# Patient Record
Sex: FEMALE | Employment: OTHER | URBAN - METROPOLITAN AREA
[De-identification: names, ages, dates, MRNs, and addresses within clinical notes are randomized per-mention and may not be internally consistent; named-entity substitution may affect disease eponyms.]

---

## 2017-03-07 ENCOUNTER — PRE VISIT (OUTPATIENT)
Dept: ORTHOPEDICS | Facility: CLINIC | Age: 72
End: 2017-03-07

## 2017-03-07 NOTE — TELEPHONE ENCOUNTER
1.  Date/reason for appt: 3/24/17 11AM - Bilateral Foot Pain  2.  Referring provider: Michell Giraldo NP  3.  Call to patient (Yes / No - short description): No, pt is referred  4.  Previous care at / records requested from: [facility] -- Will need to fax records request when closer to appt date

## 2017-03-24 ENCOUNTER — OFFICE VISIT (OUTPATIENT)
Dept: ORTHOPEDICS | Facility: CLINIC | Age: 72
End: 2017-03-24

## 2017-03-24 DIAGNOSIS — M21.41 PES PLANUS OF BOTH FEET: ICD-10-CM

## 2017-03-24 DIAGNOSIS — M72.2 PLANTAR FASCIITIS OF RIGHT FOOT: Primary | ICD-10-CM

## 2017-03-24 DIAGNOSIS — M21.42 PES PLANUS OF BOTH FEET: ICD-10-CM

## 2017-03-24 RX ORDER — HYDROCHLOROTHIAZIDE 25 MG/1
25 TABLET ORAL
COMMUNITY
End: 2021-03-12

## 2017-03-24 NOTE — NURSING NOTE
Reason For Visit:   Chief Complaint   Patient presents with     Consult     Right heel pain. Pt stated that the pain so bad that she cant put her foot on the floor.        Primary MD: Michell Giraldo  Ref. MD: SILVERIO calvin    ?  Yes, specify language: [facility]      Date of surgery: none  Type of surgery: none.    Smoker: No  Request smoking cessation information: No      Pain Assessment  Patient Currently in Pain: Yes  Primary Pain Location: Foot  Pain Orientation: Right

## 2017-03-24 NOTE — MR AVS SNAPSHOT
After Visit Summary   3/24/2017    Evonne Mccurdy    MRN: [id number]           Patient Information     Date Of Birth          1945        Visit Information        Provider Department      3/24/2017 10:45 AM Allison Crawford; Patel Taylor DPM [facility]        Today's Diagnoses     Plantar fasciitis of right foot    -  1    Pes planus of both feet          Care Instructions      Plantar Fasciitis  Plantar fasciitis is a painful swelling of the plantar fascia. The plantar fascia is a thick, fibrous layer of tissue. It covers the bones on the bottom of your foot. And it supports the foot bones in an arched position.  This can happen gradually or suddenly. It usually affects one foot at a time. Heel pain can be sharp, like a knife sticking into the bottom of your foot. You may feel pain after exercising, long-distance jogging, stair climbing, long periods of standing, or after standing up.  Risk factors include: non-active lifestyle, arthritis, diabetes, obesity or recent weight gain, flat foot, high arch. Wearing high heels, loose shoes, or shoes with poor arch support for long periods of time adds to the risk. This problem is commonly found in runners and dancers. It also found in people who stand on hard surfaces for long periods of time.    Foot pain from this condition is usually worse in the morning. But it improves with walking. By the end of the day there may be a dull aching. Treatment requires short-term rest and controlling swelling. It may take up to 9 months before all symptoms go away. Rarely, a steroid injection into the foot, or surgery, may be needed.  Home care    If you are overweight, lose weight to help healing.    Choose supportive shoes with good arch support and shock absorbency. Replace athletic shoes when they become worn out. Don t walk or run barefoot.    Premade or custom-fitted shoe inserts may be helpful. Inserts made of silicone seem to be the  most effective. Custom-made inserts can be provided by a podiatrist or foot specialist, physical therapist, or orthopedist.    Premade or custom-made night splints keep the heel stretched out while you sleep. They may prevent morning pain.    Avoid activities that stress the feet: jogging, prolonged standing or walking, contact sports, etc.    First thing in the morning and before sports, stretch the bottom of your feet. Gently flex your ankle so the toes move toward your knee.    Icing may help control heel pain. Apply an ice pack to the heel for 10-20 minutes as a preventive. Or ice your heel after a severe flare-up of symptoms. You may repeat this every 1-2 hours as needed. You can also freeze a water bottle and roll this along the bottom of your foot in the morning.    You may use over-the-counter pain medicine to control pain, unless another medicine was prescribed. Anti-inflammatory pain medicines, such as ibuprofen or naproxen, may work better than acetaminophen. If you have chronic liver or kidney disease or ever had a stomach ulcer or GI bleeding, talk with your healthcare provider before using these medicines.        Follow-up care   Follow up with your healthcare provider, physical therapist, or podiatrist or foot specialist as advised.  Call for an appointment if pain worsens or there is no relief after a few weeks of home treatment. Shoe inserts, a night splint, or a special boot may be required.  If X-rays were taken, you will be told of any new findings that may affect your care.  When to seek medical advice  Call your healthcare provider right away if any of these occur:     Foot swelling    Redness with increasing pain    9236-4089 The [employer]. [address] [place] [address], [place] [address]. All rights reserved. This information is not intended as a substitute for professional medical care. Always follow your healthcare professional's instructions.              Follow-ups after your visit         Who to contact     Please call your clinic at [phone] to:    Ask questions about your health    Make or cancel appointments    Discuss your medicines    Learn about your test results    Speak to your doctor   If you have compliments or concerns about an experience at your clinic, or if you wish to file a complaint, please contact [facility] Patient Relations at [phone] or email us at [email or web address]         Additional Information About Your Visit        E[employer] Information     [employer] is an electronic gateway that provides easy, online access to your medical records. With [employer], you can request a clinic appointment, read your test results, renew a prescription or communicate with your care team.     To sign up for [employer] visit the website at [email or web address]   You will be asked to enter the access code listed below, as well as some personal information. Please follow the directions to create your username and password.     Your access code is: [email or web address]  Expires: 2017  7:30 AM     Your access code will  in 90 days. If you need help or a new code, please contact your [facility] or call [phone] for assistance.        Care EveryWhere ID     This is your Care EveryWhere ID. This could be used by other organizations to access your [place] medical records  [id number]         Blood Pressure from Last 3 Encounters:   No data found for BP    Weight from Last 3 Encounters:   No data found for Wt               Primary Care Provider Office Phone # Fax #    Galarza JON Giraldo [phone]       [facility]  [facility] [address]        Thank you!     Thank you for choosing [facility]  for your care. Our goal is always to provide you with excellent care. Hearing back from our patients is one way we can continue to improve our services. Please take  a few minutes to complete the written survey that you may receive in the mail after your visit with us. Thank you!             Your Updated Medication List - Protect others around you: Learn how to safely use, store and throw away your medicines at www.disposemymeds.org.          This list is accurate as of: 3/24/17 11:42 AM.  Always use your most recent med list.                   Brand Name Dispense Instructions for use    hydrochlorothiazide 25 MG tablet    HYDRODIURIL     Take 25 mg by mouth       ranitidine 150 MG tablet    ZANTAC     Take 150 mg by mouth

## 2017-03-24 NOTE — LETTER
3/24/2017       RE: Evonne Mccurdy  [address]  [address]  [place] [address]     Dear Colleague,    Thank you for referring your patient, Evonne Mccurdy, to the [facility] at [facility]. Please see a copy of my visit note below.    Date of Service: 3/24/2017    Chief Complaint:   Chief Complaint   Patient presents with     Consult     Right heel pain. Pt stated that the pain so bad that she cant put her foot on the floor.         HPI: Evonne is a 72 year old female who presents today for further evaluation of right heel pain. This has been going on for the past year, but has worsened in the past 5 months. No increase in activity. She wears sketchers slip on tennis shoes every day. She has tried massaging area with petroleum jelly and black seed oil but this sometimes can make the pain worse. Only staying off of her feet helps relieve the pain. Worst with first steps of the day Denies recent trauma or skin changes to the painful area.    PMH: No past medical history on file.    PSxH: No past surgical history on file.    Allergies: Grapefruit concentrate; Fish-derived products; and Omeprazole    SH:   Social History     Social History     Marital status: Single     Spouse name: N/A     Number of children: N/A     Years of education: N/A     Occupational History     Not on file.     Social History Main Topics     Smoking status: Never Smoker     Smokeless tobacco: Not on file     Alcohol use Not on file     Drug use: Not on file     Sexual activity: Not on file     Other Topics Concern     Not on file     Social History Narrative     No narrative on file       FH: No family history on file.    Objective:  PT and DP pulses are 2/4 bilaterally. CRT is <3 seconds. Diminished pedal hair.   Gross sensation is intact bilaterally.   Equinus absent bilaterally. No pain with active or passive ROM of the ankle, MTJ, 1st ray, or halluces bilaterally. Tenderness to  palpation of the plantar/lateral calcaneous of right foot. Minimal tenderness to palpation of right arch. No tenderness to palpation of tendons of left or right foot. No gross tendon voids. No erythema, edema or ecchymosis along painful site. Flat foot type bilaterally.  Nails normal bilaterally. No open lesions are noted. Skin is normal.     X-rays of the right foot 3/24/17:   Bone spur right calcaneus. Minimal arthritic changes. No acute fracture.     Assessment:   - Plantar fasciitis of the right foot  - Pes planus    Plan:  - Pt seen and evaluated. Diagnosis and treatment options discussed.   - Educated patient on stretching therapies and using ice and NSAIDs for pain relief. Talked about trying OTC arch supports like Power Steps.   - X-rays discussed with patient  - Patient declines injection today.  - Dispensed a night splint  - RTC 8 weeks          Again, thank you for allowing me to participate in the care of your patient.      Sincerely,    Patel Taylor DPM

## 2017-03-24 NOTE — PATIENT INSTRUCTIONS

## 2017-03-24 NOTE — PROGRESS NOTES
Date of Service: 3/24/2017    Chief Complaint:   Chief Complaint   Patient presents with     Consult     Right heel pain. Pt stated that the pain so bad that she cant put her foot on the floor.         HPI: Evonne is a 72 year old female who presents today for further evaluation of right heel pain. This has been going on for the past year, but has worsened in the past 5 months. No increase in activity. She wears sketchers slip on tennis shoes every day. She has tried massaging area with petroleum jelly and black seed oil but this sometimes can make the pain worse. Only staying off of her feet helps relieve the pain. Worst with first steps of the day Denies recent trauma or skin changes to the painful area.    PMH: No past medical history on file.    PSxH: No past surgical history on file.    Allergies: Grapefruit concentrate; Fish-derived products; and Omeprazole    SH:   Social History     Social History     Marital status: Single     Spouse name: N/A     Number of children: N/A     Years of education: N/A     Occupational History     Not on file.     Social History Main Topics     Smoking status: Never Smoker     Smokeless tobacco: Not on file     Alcohol use Not on file     Drug use: Not on file     Sexual activity: Not on file     Other Topics Concern     Not on file     Social History Narrative     No narrative on file       FH: No family history on file.    Objective:  PT and DP pulses are 2/4 bilaterally. CRT is <3 seconds. Diminished pedal hair.   Gross sensation is intact bilaterally.   Equinus absent bilaterally. No pain with active or passive ROM of the ankle, MTJ, 1st ray, or halluces bilaterally. Tenderness to palpation of the plantar/lateral calcaneous of right foot. Minimal tenderness to palpation of right arch. No tenderness to palpation of tendons of left or right foot. No gross tendon voids. No erythema, edema or ecchymosis along painful site. Flat foot type bilaterally.  Nails normal bilaterally.  No open lesions are noted. Skin is normal.     X-rays of the right foot 3/24/17:   Bone spur right calcaneus. Minimal arthritic changes. No acute fracture.     Assessment:   - Plantar fasciitis of the right foot  - Pes planus    Plan:  - Pt seen and evaluated. Diagnosis and treatment options discussed.   - Educated patient on stretching therapies and using ice and NSAIDs for pain relief. Talked about trying OTC arch supports like Power Steps.   - X-rays discussed with patient  - Patient declines injection today.  - Dispensed a night splint  - RTC 8 weeks

## 2017-05-12 ENCOUNTER — OFFICE VISIT (OUTPATIENT)
Dept: ORTHOPEDICS | Facility: CLINIC | Age: 72
End: 2017-05-12

## 2017-05-12 DIAGNOSIS — M72.2 PLANTAR FASCIITIS: Primary | ICD-10-CM

## 2017-05-12 DIAGNOSIS — M21.42 PES PLANUS OF BOTH FEET: ICD-10-CM

## 2017-05-12 DIAGNOSIS — M21.41 PES PLANUS OF BOTH FEET: ICD-10-CM

## 2017-05-12 RX ORDER — DEXAMETHASONE SODIUM PHOSPHATE 4 MG/ML
INJECTION, SOLUTION INTRA-ARTICULAR; INTRALESIONAL; INTRAMUSCULAR; INTRAVENOUS; SOFT TISSUE
Qty: 120 ML | Refills: 0 | Status: SHIPPED | OUTPATIENT
Start: 2017-05-12

## 2017-05-12 NOTE — MR AVS SNAPSHOT
After Visit Summary   2017    Jeanette Mccurdy    MRN: [id number]           Patient Information     Date Of Birth          1945        Visit Information        Provider Department      2017 10:45 AM Rafael Padilla; Patel Taylor DPM [facility]        Today's Diagnoses     Plantar fasciitis    -  1    Pes planus of both feet           Follow-ups after your visit        Additional Services     PHYSICAL THERAPY REFERRAL (Internal)       Physical Therapy Referral                  Who to contact     Please call your clinic at [phone] to:    Ask questions about your health    Make or cancel appointments    Discuss your medicines    Learn about your test results    Speak to your doctor   If you have compliments or concerns about an experience at your clinic, or if you wish to file a complaint, please contact [facility] Patient Relations at [phone] or email us at [email or web address]         Additional Information About Your Visit        MyChart Information     [employer] is an electronic gateway that provides easy, online access to your medical records. With [employer], you can request a clinic appointment, read your test results, renew a prescription or communicate with your care team.     To sign up for [employer] visit the website at [email or web address]   You will be asked to enter the access code listed below, as well as some personal information. Please follow the directions to create your username and password.     Your access code is: [email or web address]  Expires: 2017  7:30 AM     Your access code will  in 90 days. If you need help or a new code, please contact your [facility] or call [phone] for assistance.        Care EveryWhere ID     This is your Care EveryWhere ID. This could be used by other organizations to access your [place] medical records  [id number]         Blood  Pressure from Last 3 Encounters:   No data found for BP    Weight from Last 3 Encounters:   No data found for Wt              We Performed the Following     PHYSICAL THERAPY REFERRAL (Internal)          Today's Medication Changes          These changes are accurate as of: 5/12/17 12:30 PM.  If you have any questions, ask your nurse or doctor.               Start taking these medicines.        Dose/Directions    dexamethasone 4 MG/ML injection   Commonly known as:  DECADRON   Used for:  Plantar fasciitis   Started by:  Patel Taylor DPM        Use 4 mg or dose determined by provider for iontophoresis.   Quantity:  120 mL   Refills:  0            Where to get your medicines      These medications were sent to [facility] - [place], [place] - [address] [place] [address]  [address] [facility] [address] [address]     Phone:  [phone]     dexamethasone 4 MG/ML injection                Primary Care Provider Office Phone # Fax #    Michell GiraldoJON [phone]       [address] [address]        Thank you!     Thank you for choosing [facility]  for your care. Our goal is always to provide you with excellent care. Hearing back from our patients is one way we can continue to improve our services. Please take a few minutes to complete the written survey that you may receive in the mail after your visit with us. Thank you!             Your Updated Medication List - Protect others around you: Learn how to safely use, store and throw away your medicines at www.disposemymeds.org.          This list is accurate as of: 5/12/17 12:30 PM.  Always use your most recent med list.                   Brand Name Dispense Instructions for use    dexamethasone 4 MG/ML injection    DECADRON    120 mL    Use 4 mg or dose determined by provider for iontophoresis.       hydrochlorothiazide 25 MG tablet    HYDRODIURIL     Take 25 mg by mouth       ranitidine  150 MG tablet    ZANTAC     Take 150 mg by mouth

## 2017-05-12 NOTE — LETTER
5/12/2017       RE: Jeanette Mccurdy  [address]  [address]  [place] [address]     Dear Colleague,    Thank you for referring your patient, eJanette Mccurdy, to the [facility] at [facility]. Please see a copy of my visit note below.    Chief Complaint:   Chief Complaint   Patient presents with     RECHECK     Follow up, Right foot pain.           Allergies   Allergen Reactions     Grapefruit Concentrate Itching     Fish-Derived Products Itching     Omeprazole GI Disturbance         Subjective: Jeanette is a 72 year old female who presents to the clinic today for a follow up of right plantar fasciitis. Her pain has only worsened in the past 2 months. She can hardly walk. She admits to performing all therapies suggested at last visit, including NSAIDs, ice, massage, stretches and the night splint. She is willing to try physical therapy, but not an injection.    Objective  PT and DP pulses are 2/4 bilaterally. CRT is <3 seconds. Diminished pedal hair.   Gross sensation is intact bilaterally.   Equinus absent bilaterally. No pain with active or passive ROM of the ankle, MTJ, 1st ray, or halluces bilaterally. Tenderness to palpation of the plantar/lateral calcaneous of right foot, right at the calcaneal tuberosity. No tenderness to palpation of right arch. Tenderness to palpation of right PT tendon as it courses around the medial malleolus.. No gross tendon voids. No erythema, edema or ecchymosis along painful site. Flat foot type bilaterally.  Nails normal bilaterally. No open lesions are noted. Skin is normal.     Assessment:   - Plantar fasciitis Right  - Pes planus    Plan:   - Pt seen and evaluated  - Pt refused injection at this time.   - Dispensed CAM boot to be worn for about 1 month during the day.  - Also suggested Physical therapy with iontophoresis. She is willing to try this.  - Pt to return to clinic if pain does not resolve      Again, thank you for  allowing me to participate in the care of your patient.      Sincerely,    Patel Taylor DPM

## 2017-05-12 NOTE — NURSING NOTE
Reason For Visit:   Chief Complaint   Patient presents with     RECHECK     Follow up, Right foot pain.        Pain Assessment  Patient Currently in Pain: Yes (Pt stated that the pain is getting worse. )  Primary Pain Location: Foot  Pain Orientation: Right                    Current Outpatient Prescriptions   Medication Sig Dispense Refill     hydrochlorothiazide (HYDRODIURIL) 25 MG tablet Take 25 mg by mouth       ranitidine (ZANTAC) 150 MG tablet Take 150 mg by mouth            Allergies   Allergen Reactions     Grapefruit Concentrate Itching     Fish-Derived Products Itching     Omeprazole GI Disturbance

## 2017-05-12 NOTE — LETTER
5/12/2017      RE: Jeanette Mccurdy  [address]]  [address]  [place] [address]       Chief Complaint:   Chief Complaint   Patient presents with     RECHECK     Follow up, Right foot pain.           Allergies   Allergen Reactions     Grapefruit Concentrate Itching     Fish-Derived Products Itching     Omeprazole GI Disturbance         Subjective: Jeanette is a 72 year old female who presents to the clinic today for a follow up of right plantar fasciitis. Her pain has only worsened in the past 2 months. She can hardly walk. She admits to performing all therapies suggested at last visit, including NSAIDs, ice, massage, stretches and the night splint. She is willing to try physical therapy, but not an injection.    Objective  PT and DP pulses are 2/4 bilaterally. CRT is <3 seconds. Diminished pedal hair.   Gross sensation is intact bilaterally.   Equinus absent bilaterally. No pain with active or passive ROM of the ankle, MTJ, 1st ray, or halluces bilaterally. Tenderness to palpation of the plantar/lateral calcaneous of right foot, right at the calcaneal tuberosity. No tenderness to palpation of right arch. Tenderness to palpation of right PT tendon as it courses around the medial malleolus.. No gross tendon voids. No erythema, edema or ecchymosis along painful site. Flat foot type bilaterally.  Nails normal bilaterally. No open lesions are noted. Skin is normal.     Assessment:   - Plantar fasciitis Right  - Pes planus    Plan:   - Pt seen and evaluated  - Pt refused injection at this time.   - Dispensed CAM boot to be worn for about 1 month during the day.  - Also suggested Physical therapy with iontophoresis. She is willing to try this.  - Pt to return to clinic if pain does not resolve      Patel Taylor DPM

## 2017-05-12 NOTE — PROGRESS NOTES
Chief Complaint:   Chief Complaint   Patient presents with     RECHECK     Follow up, Right foot pain.           Allergies   Allergen Reactions     Grapefruit Concentrate Itching     Fish-Derived Products Itching     Omeprazole GI Disturbance         Subjective: Jeanette is a 72 year old female who presents to the clinic today for a follow up of right plantar fasciitis. Her pain has only worsened in the past 2 months. She can hardly walk. She admits to performing all therapies suggested at last visit, including NSAIDs, ice, massage, stretches and the night splint. She is willing to try physical therapy, but not an injection.    Objective  PT and DP pulses are 2/4 bilaterally. CRT is <3 seconds. Diminished pedal hair.   Gross sensation is intact bilaterally.   Equinus absent bilaterally. No pain with active or passive ROM of the ankle, MTJ, 1st ray, or halluces bilaterally. Tenderness to palpation of the plantar/lateral calcaneous of right foot, right at the calcaneal tuberosity. No tenderness to palpation of right arch. Tenderness to palpation of right PT tendon as it courses around the medial malleolus.. No gross tendon voids. No erythema, edema or ecchymosis along painful site. Flat foot type bilaterally.  Nails normal bilaterally. No open lesions are noted. Skin is normal.     Assessment:   - Plantar fasciitis Right  - Pes planus    Plan:   - Pt seen and evaluated  - Pt refused injection at this time.   - Dispensed CAM boot to be worn for about 1 month during the day.  - Also suggested Physical therapy with iontophoresis. She is willing to try this.  - Pt to return to clinic if pain does not resolve

## 2017-06-09 ENCOUNTER — OFFICE VISIT (OUTPATIENT)
Dept: ORTHOPEDICS | Facility: CLINIC | Age: 72
End: 2017-06-09

## 2017-06-09 VITALS — WEIGHT: 173.5 LBS | HEIGHT: 63 IN | BODY MASS INDEX: 30.74 KG/M2

## 2017-06-09 DIAGNOSIS — M72.2 PLANTAR FASCIITIS: Primary | ICD-10-CM

## 2017-06-09 DIAGNOSIS — M21.42 PES PLANUS OF BOTH FEET: ICD-10-CM

## 2017-06-09 DIAGNOSIS — M21.41 PES PLANUS OF BOTH FEET: ICD-10-CM

## 2017-06-09 NOTE — MR AVS SNAPSHOT
After Visit Summary   2017    Jeanette Mccurdy    MRN: [id number]           Patient Information     Date Of Birth          1945        Visit Information        Provider Department      2017 12:05 PM Rafael Padilla Aaron Daniel, DPM [facility]         Follow-ups after your visit        Your next 10 appointments already scheduled     2017 12:00 PM CDT   (Arrive by 11:45 AM)   RETURN FOOT/ANKLE with JACK Liu [facility] ([facility] [place])    [address] [place] [address] [phone]              Who to contact     Please call your clinic at [phone] to:    Ask questions about your health    Make or cancel appointments    Discuss your medicines    Learn about your test results    Speak to your doctor   If you have compliments or concerns about an experience at your clinic, or if you wish to file a complaint, please contact [facility] Patient Relations at [phone] or email us at [email or web address]         Additional Information About Your Visit        MyChart Information     [employer] is an electronic gateway that provides easy, online access to your medical records. With [employer], you can request a clinic appointment, read your test results, renew a prescription or communicate with your care team.     To sign up for [employer] visit the website at [email or web address]   You will be asked to enter the access code listed below, as well as some personal information. Please follow the directions to create your username and password.     Your access code is: [email or web address]  Expires: 2017 12:48 PM     Your access code will  in 90 days. If you need help or a new code, please contact your [facility] or call [phone] for assistance.        Care EveryWhere ID     This is your Care EveryWhere ID.  "This could be used by other organizations to access your [facility] medical records  [id number]        Your Vitals Were     Height BMI (Body Mass Index)                1.6 m (5' 3\") 30.73 kg/m2           Blood Pressure from Last 3 Encounters:   No data found for BP    Weight from Last 3 Encounters:   06/09/17 78.7 kg (173 lb 8 oz)              Today, you had the following     No orders found for display       Primary Care Provider Office Phone # Fax #    Michell JON Giraldo [phone]       [facility] [address] [address]        Thank you!     Thank you for choosing [facility]  for your care. Our goal is always to provide you with excellent care. Hearing back from our patients is one way we can continue to improve our services. Please take a few minutes to complete the written survey that you may receive in the mail after your visit with us. Thank you!             Your Updated Medication List - Protect others around you: Learn how to safely use, store and throw away your medicines at www.disposemymeds.org.          This list is accurate as of: 6/9/17 12:48 PM.  Always use your most recent med list.                   Brand Name Dispense Instructions for use    dexamethasone 4 MG/ML injection    DECADRON    120 mL    Use 4 mg or dose determined by provider for iontophoresis.       hydrochlorothiazide 25 MG tablet    HYDRODIURIL     Take 25 mg by mouth       ranitidine 150 MG tablet    ZANTAC     Take 150 mg by mouth         "

## 2017-06-09 NOTE — NURSING NOTE
"Reason For Visit:   Chief Complaint   Patient presents with     Foot Problems     f/u right foot pain       Ht 1.6 m (5' 3\")  Wt 78.7 kg (173 lb 8 oz)  BMI 30.73 kg/m2    Pain Assessment  Patient Currently in Pain: Yes  0-10 Pain Scale: 6  Primary Pain Location: Foot  Pain Orientation: Right  Alleviating Factors: Pain medication (ibuprofen)  Aggravating Factors: Walking  "

## 2017-06-09 NOTE — LETTER
"6/9/2017       RE: Jeanette Mccurdy  [address]    [place] [address]     Dear Colleague,    Thank you for referring your patient, Jeanette Mccurdy, to the [facility]. Please see a copy of my visit note below.    Chief Complaint:   Chief Complaint   Patient presents with     Foot Problems     f/u right foot pain     Allergies   Allergen Reactions     Grapefruit Concentrate Itching     Fish-Derived Products Itching     Omeprazole GI Disturbance   Subjective: Jeaentte is a 72 year old female who presents to the clinic today for a follow up of right plantar fasciitis. She relates that she is feeling a little better with the CAM. She has not used the night splint in weeks and has not started PT. She is still having some pain.    Objective   5' 3\" 173 lbs 8 oz  Pain noted with palpation of the medial origin of the plantar fascia on the right. Pain with palpation of the courses of the PT and peroneal tendons, however there is no pain with heel raises on the affected side.     Assessment: Right foot plantar fasciitis.     Plan:   - Pt seen and evaluated  - She should return to using the night splint. Also needs to start with PT. I think these modalities will be helpful for her.   - Pt to return to clinic in 6 weeks after starting PT.       Again, thank you for allowing me to participate in the care of your patient.      Sincerely,    Patel Taylor DPM      "

## 2017-06-09 NOTE — PROGRESS NOTES
"Chief Complaint:   Chief Complaint   Patient presents with     Foot Problems     f/u right foot pain          Allergies   Allergen Reactions     Grapefruit Concentrate Itching     Fish-Derived Products Itching     Omeprazole GI Disturbance         Subjective: Jeanette is a 72 year old female who presents to the clinic today for a follow up of right plantar fasciitis. She relates that she is feeling a little better with the CAM. She has not used the night splint in weeks and has not started PT. She is still having some pain.    Objective   5' 3\" 173 lbs 8 oz  Pain noted with palpation of the medial origin of the plantar fascia on the right. Pain with palpation of the courses of the PT and peroneal tendons, however there is no pain with heel raises on the affected side.     Assessment: Right foot plantar fasciitis.     Plan:   - Pt seen and evaluated  - She should return to using the night splint. Also needs to start with PT. I think these modalities will be helpful for her.   - Pt to return to clinic in 6 weeks after starting PT.     "

## 2017-07-21 ENCOUNTER — OFFICE VISIT (OUTPATIENT)
Dept: ORTHOPEDICS | Facility: CLINIC | Age: 72
End: 2017-07-21

## 2017-07-21 DIAGNOSIS — M72.2 PLANTAR FASCIITIS: Primary | ICD-10-CM

## 2017-07-21 DIAGNOSIS — M21.42 PES PLANUS OF BOTH FEET: ICD-10-CM

## 2017-07-21 DIAGNOSIS — M21.41 PES PLANUS OF BOTH FEET: ICD-10-CM

## 2017-07-21 NOTE — MR AVS SNAPSHOT
After Visit Summary   2017    Jeanette Mccurdy    MRN: [id number]           Patient Information     Date Of Birth          1945        Visit Information        Provider Department      2017 11:45 AM Rafael Padilla Aaron Daniel, DPM M [facility]         Follow-ups after your visit        Your next 10 appointments already scheduled     Sep 01, 2017  3:20 PM CDT   (Arrive by 3:05 PM)   RETURN FOOT/ANKLE with JACK Liu [facility] ([facility] [place])    [address] [place] [address] [phone]              Who to contact     Please call your clinic at [phone] to:    Ask questions about your health    Make or cancel appointments    Discuss your medicines    Learn about your test results    Speak to your doctor   If you have compliments or concerns about an experience at your clinic, or if you wish to file a complaint, please contact [facility] Patient Relations at [phone] or email us at [email or web address]         Additional Information About Your Visit        MyChart Information     [employer] is an electronic gateway that provides easy, online access to your medical records. With [employer], you can request a clinic appointment, read your test results, renew a prescription or communicate with your care team.     To sign up for [employer] visit the website at [email or web address]   You will be asked to enter the access code listed below, as well as some personal information. Please follow the directions to create your username and password.     Your access code is: [email or web address]  Expires: 2017 12:48 PM     Your access code will  in 90 days. If you need help or a new code, please contact your [facility] or call [phone] for assistance.        Care EveryWhere ID     This is your Care EveryWhere  ID. This could be used by other organizations to access your [facility] [place] medical records  [id number]         Blood Pressure from Last 3 Encounters:   No data found for BP    Weight from Last 3 Encounters:   06/09/17 78.7 kg (173 lb 8 oz)              Today, you had the following     No orders found for display       Primary Care Provider Office Phone # Fax #    Michell Giraldo [phone]       [facility] [address] [address]        Equal Access to Services     CHARLY WILKINSON : Hadii aad ku hadasho Soomaali, waaxda luqadaha, qaybta kaalmada adeegyada, waxay idiin hayaan adeeg kharash la'dion . So [facility] [phone].    ATENCIÓN: Si habla español, tiene a field disposición servicios gratuitos de asistencia lingüística. [facility] [phone].    We comply with applicable federal civil rights laws and [place] laws. We do not discriminate on the basis of race, color, national origin, age, disability sex, sexual orientation or gender identity.            Thank you!     Thank you for choosing [facility]  for your care. Our goal is always to provide you with excellent care. Hearing back from our patients is one way we can continue to improve our services. Please take a few minutes to complete the written survey that you may receive in the mail after your visit with us. Thank you!             Your Updated Medication List - Protect others around you: Learn how to safely use, store and throw away your medicines at www.disposemymeds.org.          This list is accurate as of: 7/21/17 12:46 PM.  Always use your most recent med list.                   Brand Name Dispense Instructions for use Diagnosis    dexamethasone 4 MG/ML injection    DECADRON    120 mL    Use 4 mg or dose determined by provider for iontophoresis.    Plantar fasciitis       hydrochlorothiazide 25 MG tablet    HYDRODIURIL     Take 25 mg by mouth        ranitidine 150 MG tablet    ZANTAC     Take  150 mg by mouth

## 2017-07-21 NOTE — NURSING NOTE
Reason For Visit:   Chief Complaint   Patient presents with     RECHECK     Follow up for right foot pain.        Pain Assessment  Patient Currently in Pain: Yes  0-10 Pain Scale: 7  Primary Pain Location: Foot  Pain Orientation: Right    The following medication was given:     MEDICATION:  Kenalog 40 mg  ROUTE: Intra-articular   SITE: Right medial heel  DOSE: 1 mL  LOT #: [id number]  : [employer]  EXPIRATION DATE: 12/2018  NDC#: 6034-4100-17   Was there drug waste? No      Mary Ruth LPN  July 21, 2017    The following medication was given:     MEDICATION:  Dexamethasone  ROUTE: Intra-articular   SITE: Right medial heel  DOSE: 1 mL  LOT #: [id number]  : [employer]  EXPIRATION DATE: 08/18  NDC#: 62616-269-85   Was there drug waste? No      Mary Ruth LPN  July 21, 2017    The following medication was given:     MEDICATION:  Lidocaine without epinephrine  ROUTE: Intra-articular   SITE: Rigth medial heel  DOSE: 1 mL  LOT #: [id number]   : [employer]  EXPIRATION DATE: 3.1.19  NDC#: 6233-8831-10   Was there drug waste? Yes  Amount of drug waste (mL): 19.  Reason for waste:  multidose vial      Mary Ruth LPN  July 21, 2017

## 2017-07-21 NOTE — LETTER
7/21/2017       RE: Jeanette Mccurdy  [address]    [place] [address]     Dear Colleague,    Thank you for referring your patient, Jeanette Mccurdy, to the [facility] at [facility]. Please see a copy of my visit note below.    Chief Complaint: No chief complaint on file.         Allergies   Allergen Reactions     Grapefruit Concentrate Itching     Fish-Derived Products Itching     Omeprazole GI Disturbance         Subjective: Jeanette is a 72 year old female who presents to the clinic today for a follow up of right plantar fasciitis. Since her last visit, she has not started PT. she presents today with a [employer] . She relates that she told the physical therapist that she had broken bones in her foot and that due to her telling physical therapy that, they would not treat her for the plantar fasciitis. She relates that she has been wearing the boot as directed. She says that she is still having pain in the right heel today.    Objective    Pain is noted with palpation of the medial origin of the plantar fascia on the calcaneus on the right foot. There is no pain noted today along the courses of the Achilles, PT, or peroneal tendons on the right foot. There is no pain with palpation along the tensed medial band of the plantar fascia. There is no pain in the lateral origin of the plantar fascia on the calcaneus.    Assessment: Right foot plantar fasciitis.    Plan:   - Pt seen and evaluated  - I spoke to her about starting PT again. I related to her that she can start this as no bones are broken in her foot.   - She would like to try an injection today. I discussed the injection therapy with her. I discussed the risks, complications, benefits, alternatives, and answered all of her questions. Consent was signed. After skin prep with Chlorprep, an injection conssting of 1cc lidocaine 1% plain + 1cc kenalog-40 + 1cc dexamethasone 4mg/cc was  injected into the right heel trigger point. She tolerated this well with no complications.   - She should continue with the boot and the night splint for now.   - Pt to return to clinic in 6 weeks.       Patel Taylor DPM

## 2017-07-21 NOTE — LETTER
7/21/2017      RE: Jeanette Mccurdy  [address]    [place] [address]       Chief Complaint: No chief complaint on file.         Allergies   Allergen Reactions     Grapefruit Concentrate Itching     Fish-Derived Products Itching     Omeprazole GI Disturbance         Subjective: Jeanette is a 72 year old female who presents to the clinic today for a follow up of right plantar fasciitis. Since her last visit, she has not started PT. she presents today with a [employer]az . She relates that she told the physical therapist that she had broken bones in her foot and that due to her telling physical therapy that, they would not treat her for the plantar fasciitis. She relates that she has been wearing the boot as directed. She says that she is still having pain in the right heel today.    Objective    Pain is noted with palpation of the medial origin of the plantar fascia on the calcaneus on the right foot. There is no pain noted today along the courses of the Achilles, PT, or peroneal tendons on the right foot. There is no pain with palpation along the tensed medial band of the plantar fascia. There is no pain in the lateral origin of the plantar fascia on the calcaneus.    Assessment: Right foot plantar fasciitis.    Plan:   - Pt seen and evaluated  - I spoke to her about starting PT again. I related to her that she can start this as no bones are broken in her foot.   - She would like to try an injection today. I discussed the injection therapy with her. I discussed the risks, complications, benefits, alternatives, and answered all of her questions. Consent was signed. After skin prep with Chlorprep, an injection conssting of 1cc lidocaine 1% plain + 1cc kenalog-40 + 1cc dexamethasone 4mg/cc was injected into the right heel trigger point. She tolerated this well with no complications.   - She should continue with the boot and the night splint for now.   - Pt to return to clinic in 6 weeks.        Patel Taylor, OLYAM

## 2017-07-21 NOTE — PROGRESS NOTES
Chief Complaint: No chief complaint on file.         Allergies   Allergen Reactions     Grapefruit Concentrate Itching     Fish-Derived Products Itching     Omeprazole GI Disturbance         Subjective: Jeanette is a 72 year old female who presents to the clinic today for a follow up of right plantar fasciitis. Since her last visit, she has not started PT. she presents today with a Boaz . She relates that she told the physical therapist that she had broken bones in her foot and that due to her telling physical therapy that, they would not treat her for the plantar fasciitis. She relates that she has been wearing the boot as directed. She says that she is still having pain in the right heel today.    Objective    Pain is noted with palpation of the medial origin of the plantar fascia on the calcaneus on the right foot. There is no pain noted today along the courses of the Achilles, PT, or peroneal tendons on the right foot. There is no pain with palpation along the tensed medial band of the plantar fascia. There is no pain in the lateral origin of the plantar fascia on the calcaneus.    Assessment: Right foot plantar fasciitis.    Plan:   - Pt seen and evaluated  - I spoke to her about starting PT again. I related to her that she can start this as no bones are broken in her foot.   - She would like to try an injection today. I discussed the injection therapy with her. I discussed the risks, complications, benefits, alternatives, and answered all of her questions. Consent was signed. After skin prep with Chlorprep, an injection conssting of 1cc lidocaine 1% plain + 1cc kenalog-40 + 1cc dexamethasone 4mg/cc was injected into the right heel trigger point. She tolerated this well with no complications.   - She should continue with the boot and the night splint for now.   - Pt to return to clinic in 6 weeks.

## 2017-09-12 ENCOUNTER — OFFICE VISIT (OUTPATIENT)
Dept: ORTHOPEDICS | Facility: CLINIC | Age: 72
End: 2017-09-12

## 2017-09-12 DIAGNOSIS — M21.41 PES PLANUS OF BOTH FEET: ICD-10-CM

## 2017-09-12 DIAGNOSIS — M72.2 PLANTAR FASCIITIS: ICD-10-CM

## 2017-09-12 DIAGNOSIS — M21.42 PES PLANUS OF BOTH FEET: ICD-10-CM

## 2017-09-12 NOTE — PROGRESS NOTES
Chief Complaint:   Chief Complaint   Patient presents with     RECHECK     6 week follow up. Pain, right foot. Pt stated that her right foot is a little swollen.      Allergies   Allergen Reactions     Grapefruit Concentrate Itching     Fish-Derived Products Itching     Omeprazole GI Disturbance     Subjective: Jeanette is a 72 year old female who presents to the clinic today for a follow up of right plantar fasciitis. Presents with [employer] . She relates that she has been wearing the boot as directed. Hasn't performed home stretching exercises or been established with PT. Still wearing the CAM walker at all times. The injection worked very well. She reports mild pain at the same medial insertion site, but it has improved greatly. Relates that her right arch feels swollen, but is not painful.      Objective:  Pain is noted with palpation of the medial origin of the plantar fascia on the calcaneus on the right foot. There is no pain noted today along the courses of the Achilles, PT, or peroneal tendons on the right foot. There is no pain with palpation along the tensed medial band of the plantar fascia. There is no pain in the lateral origin of the plantar fascia on the calcaneus. No edema, erythema, rashes or open lesions.     Assessment: Right foot plantar fasciitis.     Plan:   - Pt seen and evaluated  - Continue night splint and conservative therapy measures. Discontinue CAM boot, slowly transition into regular shoe.   - Casted patient for custom orthotics  - Pt to return to clinic in 2 months if pain worsens or fails to improve

## 2017-09-12 NOTE — NURSING NOTE
Reason For Visit:   Chief Complaint   Patient presents with     RECHECK     6 week follow up. Pain, right foot. Pt stated that her right foot is a little swollen.          : Yes, Daina.     Pain Assessment  Patient Currently in Pain: Unable to assess (Pt stated that her right foot is swollen and feels heavy. )            Current Outpatient Prescriptions   Medication Sig Dispense Refill     dexamethasone (DECADRON) 4 MG/ML injection Use 4 mg or dose determined by provider for iontophoresis. 120 mL 0     hydrochlorothiazide (HYDRODIURIL) 25 MG tablet Take 25 mg by mouth       ranitidine (ZANTAC) 150 MG tablet Take 150 mg by mouth            Allergies   Allergen Reactions     Grapefruit Concentrate Itching     Fish-Derived Products Itching     Omeprazole GI Disturbance

## 2017-09-12 NOTE — LETTER
9/12/2017       RE: Jeanette Mccurdy  [address]    [place] [facility] [address]     Dear Colleague,    Thank you for referring your patient, Jeanette Mccurdy, to the [facility] at [facility]. Please see a copy of my visit note below.    Chief Complaint:   Chief Complaint   Patient presents with     RECHECK     6 week follow up. Pain, right foot. Pt stated that her right foot is a little swollen.      Allergies   Allergen Reactions     Grapefruit Concentrate Itching     Fish-Derived Products Itching     Omeprazole GI Disturbance     Subjective: Jeanette is a 72 year old female who presents to the clinic today for a follow up of right plantar fasciitis. Presents with [employer] . She relates that she has been wearing the boot as directed. Hasn't performed home stretching exercises or been established with PT. Still wearing the CAM walker at all times. The injection worked very well. She reports mild pain at the same medial insertion site, but it has improved greatly. Relates that her right arch feels swollen, but is not painful.      Objective:  Pain is noted with palpation of the medial origin of the plantar fascia on the calcaneus on the right foot. There is no pain noted today along the courses of the Achilles, PT, or peroneal tendons on the right foot. There is no pain with palpation along the tensed medial band of the plantar fascia. There is no pain in the lateral origin of the plantar fascia on the calcaneus. No edema, erythema, rashes or open lesions.     Assessment: Right foot plantar fasciitis.     Plan:   - Pt seen and evaluated  - Continue night splint and conservative therapy measures. Discontinue CAM boot, slowly transition into regular shoe.   - Casted patient for custom orthotics  - Pt to return to clinic in 2 months if pain worsens or fails to improve     Patel Taylor, JACK

## 2017-09-12 NOTE — LETTER
Verification of Appointment  2017     Seen today: yes    Patient:  Jeanette Mccurdy  :   1945  MRN:     [id number]  Physician:    PATEL TAYLOR  [facility]    Jeanette Mccurdy may participate in physical therapy activities for her right plantar fasciitis.                   Electronically signed by Patel Taylor DPM

## 2017-09-12 NOTE — MR AVS SNAPSHOT
After Visit Summary   9/12/2017    Jeanette Mccurdy    MRN: [id number]           Patient Information     Date Of Birth          1945        Visit Information        Provider Department      9/12/2017 2:25 PM Patel Taylor DPM; [employer] [facility]        Today's Diagnoses     Plantar fasciitis        Pes planus of both feet           Follow-ups after your visit        Additional Services     ORTHOTICS REFERRAL       **This referral order prints off in the [facility]  (Orthotics & Prosthetics) Central Scheduling Office**    The [facility] Central Scheduling Staff will contact the patient to schedule appointments.     Central Scheduling Contact Information: [phone] ([place])    Orthotics: Bilateral Shoe/s    Please be aware that coverage of these services is subject to the terms and limitations of your health insurance plan.  Call member services at your health plan with any benefit or coverage questions.      Please bring the following to your appointment:    >>   Any x-rays, CTs or MRIs which have been performed.  Contact the facility where they were done to arrange for  prior to your scheduled appointment.    >>   List of current medications   >>   This referral request   >>   Any documents/labs given to you for this referral                  Who to contact     Please call your clinic at [phone] to:    Ask questions about your health    Make or cancel appointments    Discuss your medicines    Learn about your test results    Speak to your doctor   If you have compliments or concerns about an experience at your clinic, or if you wish to file a complaint, please contact [facility] Patient Relations at [phone] or email us at [email or web address]         Additional Information About Your Visit        Tra[employer] Information     [employer] is an electronic gateway that provides easy, online  access to your medical records. With [employer], you can request a clinic appointment, read your test results, renew a prescription or communicate with your care team.     To sign up for [employer] visit the website at [email or web address]   You will be asked to enter the access code listed below, as well as some personal information. Please follow the directions to create your username and password.     Your access code is: [email or web address]  Expires: 12/10/2017  6:30 AM     Your access code will  in 90 days. If you need help or a new code, please contact your [facility] or call [phone] for assistance.        Care EveryWhere ID     This is your Care EveryWhere ID. This could be used by other organizations to access your [facility] medical records  [id number]         Blood Pressure from Last 3 Encounters:   No data found for BP    Weight from Last 3 Encounters:   17 78.7 kg (173 lb 8 oz)              We Performed the Following     ORTHOTICS REFERRAL        Primary Care Provider Office Phone # Fax #    Michell JON Giraldo [phone]       [facility]  [place] [address]        Equal Access to Services     CHARLY WILKINSON : Hadii aad ku hadasho Sonishaali, waaxda luqadaha, qaybta kaalmada adeegyada, crystal cabral. So [facility] [phone].    ATENCIÓN: Si habla español, tiene a field disposición servicios gratuitos de asistencia lingüística. [place] [phone].    We comply with applicable federal civil rights laws and [place] laws. We do not discriminate on the basis of race, color, national origin, age, disability sex, sexual orientation or gender identity.            Thank you!     Thank you for choosing [facility]  for your care. Our goal is always to provide you with excellent care. Hearing back from our patients is one way we can continue to improve our services. Please take a few  minutes to complete the written survey that you may receive in the mail after your visit with us. Thank you!             Your Updated Medication List - Protect others around you: Learn how to safely use, store and throw away your medicines at www.disposemymeds.org.          This list is accurate as of: 9/12/17  3:02 PM.  Always use your most recent med list.                   Brand Name Dispense Instructions for use Diagnosis    dexamethasone 4 MG/ML injection    DECADRON    120 mL    Use 4 mg or dose determined by provider for iontophoresis.    Plantar fasciitis       hydrochlorothiazide 25 MG tablet    HYDRODIURIL     Take 25 mg by mouth        ranitidine 150 MG tablet    ZANTAC     Take 150 mg by mouth

## 2017-12-06 ENCOUNTER — DOCUMENTATION ONLY (OUTPATIENT)
Dept: ORTHOPEDICS | Facility: CLINIC | Age: 72
End: 2017-12-06

## 2017-12-06 NOTE — PROGRESS NOTES
S)  Patient arrived with her friend, the  was 20 minutes late to the appointment.    O)  Patient had brought new shoes with her to today's appointment.    A)  Fit and delivered custom molded foot orthotics.  Trimmed the FO's to length and width of shoes provided.  Patient ambulated around  to office for about five minutes and stated the FO's felt comfortable to her; skin check after ambulation was good.  Advised the patient to a wear schedule of hours the first day, increasing wear time by one hour each day after the first.  Furthermore, I advised the patient to preform skin checks herself looking for dark red spots.   If the red spots are present and not disapaiting after ten minutes to discontinue use and call me for an adjustment.  Provided patient with manufacture wear and care instructions.    P)  Provided patient with a business card ([employer], I don't have any at [place] location) should she have any future questions or comments.    Elliot Obrien CPO.

## 2019-06-12 ENCOUNTER — DOCUMENTATION ONLY (OUTPATIENT)
Dept: CARE COORDINATION | Facility: CLINIC | Age: 74
End: 2019-06-12

## 2019-06-27 NOTE — PROGRESS NOTES
Chief Complaint   Patient presents with     Follow Up     Right foot pain              Allergies   Allergen Reactions     Grapefruit Concentrate Itching     Fish-Derived Products Itching     Omeprazole GI Disturbance         Subjective: Jeanette is a 74 year old female who presents to the clinic today for a follow up of right foot pain. She was last seen in September of 2017. She relates that she did get the orthotics then and these were helpful. Relates that these became worn. She does have pain in the heel again. Has not used the night splint. Had an injection in July of 2017. This also helped quite a bit, but only for 3 months. I referred her to PT before, but she has not been to [facility]. She relates that the last time she went, she was told she had broken bones and they would not perform PT on her.     Objective  Pain noted with palpation of the medial origin of the plantar fascia on the right. Pain with palpation of the courses of the PT and peroneal tendons, however there is no pain with heel raises on the affected side.   No edema or ecchymosis.  No Achilles tendon pain. MMT 5/5 with pain in all directions.      Assessment: Right foot plantar fasciitis.   PTTD.    Plan:   - Pt seen and evaluated  - I have recommended a CAM. We showed her this and she refused to wear it.  - I have referred her to [facility], but she also refused this and will go back to the PT from before.  - She would like another injection, however I would like to save these for a last resort.   - Pt to return to clinic in 1 month.

## 2019-07-01 ENCOUNTER — OFFICE VISIT (OUTPATIENT)
Dept: ORTHOPEDICS | Facility: CLINIC | Age: 74
End: 2019-07-01
Payer: COMMERCIAL

## 2019-07-01 ENCOUNTER — ANCILLARY PROCEDURE (OUTPATIENT)
Dept: GENERAL RADIOLOGY | Facility: CLINIC | Age: 74
End: 2019-07-01
Attending: FAMILY MEDICINE
Payer: COMMERCIAL

## 2019-07-01 VITALS — BODY MASS INDEX: 31.73 KG/M2 | HEIGHT: 62 IN | RESPIRATION RATE: 16 BRPM

## 2019-07-01 DIAGNOSIS — M21.41 PES PLANUS OF BOTH FEET: ICD-10-CM

## 2019-07-01 DIAGNOSIS — M25.561 RIGHT KNEE PAIN, UNSPECIFIED CHRONICITY: Primary | ICD-10-CM

## 2019-07-01 DIAGNOSIS — M72.2 PLANTAR FASCIITIS: Primary | ICD-10-CM

## 2019-07-01 DIAGNOSIS — M17.31 POST-TRAUMATIC OSTEOARTHRITIS OF RIGHT KNEE: ICD-10-CM

## 2019-07-01 DIAGNOSIS — M21.42 PES PLANUS OF BOTH FEET: ICD-10-CM

## 2019-07-01 DIAGNOSIS — M76.829 PTTD (POSTERIOR TIBIAL TENDON DYSFUNCTION): ICD-10-CM

## 2019-07-01 DIAGNOSIS — M25.561 RIGHT KNEE PAIN, UNSPECIFIED CHRONICITY: ICD-10-CM

## 2019-07-01 DIAGNOSIS — M79.671 RIGHT FOOT PAIN: ICD-10-CM

## 2019-07-01 RX ORDER — CELECOXIB 100 MG/1
100 CAPSULE ORAL 2 TIMES DAILY
Qty: 60 CAPSULE | Refills: 0 | Status: SHIPPED | OUTPATIENT
Start: 2019-07-01 | End: 2019-07-31

## 2019-07-01 NOTE — PROGRESS NOTES
[facility]  Orthopedics  Pawel Pina MD  2019     Name: Jeanette Mccurdy  MRN: [id number]  Age: 74 year old  : 1945  Referring provider: Patel Taylor    An  was called in for today's visit.      Chief Complaint: Right knee pain    Date of Injury: 3 months ago    History of Present Illness:   Jeanette Mccurdy is a 74 year old, female who presents today for evaluation of her right knee pain. The patient reports a history of falling onto her knee on 06 and being placed in a cast. She had no pain after her recovery, but recently over the past 3 months she has been experiencing anterior right knee pain when walking, sitting, and sleeping. She takes Tylenol for pain relief.     Review of Systems:   A 10-point review of systems was obtained and is negative except for as noted in the HPI.     Medications:   Current Outpatient Medications:      dexamethasone (DECADRON) 4 MG/ML injection, Use 4 mg or dose determined by provider for iontophoresis., Disp: 120 mL, Rfl: 0     hydrochlorothiazide (HYDRODIURIL) 25 MG tablet, Take 25 mg by mouth, Disp: , Rfl:      ranitidine (ZANTAC) 150 MG tablet, Take 150 mg by mouth, Disp: , Rfl:     Allergies:  Grapefruit concentrate; Fish-derived products; and Omeprazole     Past Medical History:  Hypertension    Past Surgical History:  The patient does not have any pertinent past surgical history.    Social History:  The patient denies tobacco use.     Family History:  No past pertinent family history.    Physical Examination:  Patient is alert, No acute distress, pleasant and conversational.    Gait: antalgic.    right knee:   Skin intact. No erythema or ecchymosis.  No effusion or soft tissue swelling.    AROM: Zero to approximately 120  with pain on end flexion    Palpation:   TTP over patella and medial and lateral joint lines anteriorly  No medial or lateral facet joint tenderness.    Special Tests:  No ligamentous laxity or pain with valgus or varus  stress.      Full Isometric quad strength, extensor mechanism in place     Neurovascularly intact in the lower extremity    Imaging:   XR right knee - 3 views (7/1/19)  Performed and reviewed independently demonstrating old healed patellar fracture and sunrise view with significant posttraumatic arthritis the patellofemoral joint.  Mild DJD in the medial lateral compartments.  See EMR for formal radiology report.    I have independently reviewed the above imaging studies; the results were discussed with the patient.       Assessment:   74 year old, female with persistent right patellofemoral knee pain secondary 2006 after patella fracture in 2006.      Plan:   We discussed possible treatment options including bracing, medications, and injections into the knee. We will prescribe the patient Celebrex to use when her pain is at its worse, but she should not use it regularly. She can continue to take Tylenol as needed for daily pain control. We will also provide her with a knee brace. If her pain does not subside, we can consider a CSI into the knee.     Pawel Pina MD      Scribe Disclosure:  I, Kobi Munroe, am serving as a scribe to document services personally performed by Pawel Pina MD at this visit, based upon the provider's statements to me. All documentation has been reviewed by the aforementioned provider prior to being entered into the official medical record.

## 2019-07-01 NOTE — LETTER
7/1/2019       RE: Jeanette Mccurdy  [address]  [address]  [place] [address]     Dear Colleague,    Thank you for referring your patient, Jeanette Mccurdy, to the [facility]. Please see a copy of my visit note below.    Chief Complaint   Patient presents with     Follow Up     Right foot pain              Allergies   Allergen Reactions     Grapefruit Concentrate Itching     Fish-Derived Products Itching     Omeprazole GI Disturbance         Subjective: Jeanette is a 74 year old female who presents to the clinic today for a follow up of right foot pain. She was last seen in September of 2017. She relates that she did get the orthotics then and these were helpful. Relates that these became worn. She does have pain in the heel again. Has not used the night splint. Had an injection in July of 2017. This also helped quite a bit, but only for 3 months. I referred her to PT before, but she has not been to [facility]. She relates that the last time she went, she was told she had broken bones and they would not perform PT on her.     Objective  Pain noted with palpation of the medial origin of the plantar fascia on the right. Pain with palpation of the courses of the PT and peroneal tendons, however there is no pain with heel raises on the affected side.   No edema or ecchymosis.  No Achilles tendon pain. MMT 5/5 with pain in all directions.      Assessment: Right foot plantar fasciitis.   PTTD.    Plan:   - Pt seen and evaluated  - I have recommended a CAM. We showed her this and she refused to wear it.  - I have referred her to [facility], but she also refused this and will go back to the PT from before.  - She would like another injection, however I would like to save these for a last resort.   - Pt to return to clinic in 1 month.       Again, thank you for allowing me to participate in the care of your patient.      Sincerely,    Patel Taylor,  JACK

## 2019-07-01 NOTE — PATIENT INSTRUCTIONS
Use acetaminophen for regular daily pain control  Use celebrex for a few days at a time for painful flare ups  Make appointment in clinic if you would like to try steroid injection

## 2019-07-01 NOTE — PROGRESS NOTES
SPORTS & ORTHOPEDIC WALK-IN VISIT 7/1/2019    Primary Care Physician: Dr. Giraldo    Started having knee pain for the last three months. Wondering about pain medication and an xray.  Believes the pain is caused by wearing a boot.       Reason for visit:     What part of your body is injured / painful?  right knee    What caused the injury /pain? Wearing a boot     How long ago did your injury occur or pain begin? several months ago    What are your most bothersome symptoms? Pain    How would you characterize your symptom?  sharp    What makes your symptoms better? Nothing    What makes your symptoms worse? Walking, kneeling     Have you been previously seen for this problem? No    Medical History:    Any recent changes to your medical history? No    Any new medication prescribed since last visit? No    Have you had surgery on this body part before? Yes for fracture    Social History:    Occupation: Retired

## 2019-08-30 ENCOUNTER — OFFICE VISIT (OUTPATIENT)
Dept: ORTHOPEDICS | Facility: CLINIC | Age: 74
End: 2019-08-30
Payer: COMMERCIAL

## 2019-08-30 DIAGNOSIS — M72.2 PLANTAR FASCIITIS: Primary | ICD-10-CM

## 2019-08-30 DIAGNOSIS — M21.41 PES PLANUS OF BOTH FEET: ICD-10-CM

## 2019-08-30 DIAGNOSIS — M21.42 PES PLANUS OF BOTH FEET: ICD-10-CM

## 2019-08-30 DIAGNOSIS — M79.671 RIGHT FOOT PAIN: ICD-10-CM

## 2019-08-30 NOTE — LETTER
8/30/2019       RE: Jeanette Mccurdy  [address]  [address]  [place] [address]     Dear Colleague,    Thank you for referring your patient, Jeanette Mccurdy, to the [facility] at [facility]. Please see a copy of my visit note below.    Chief Complaint:   Chief Complaint   Patient presents with     RECHECK     1 month follow up  right plantar pain would like new inserts       Allergies   Allergen Reactions     Grapefruit Concentrate Itching     Fish-Derived Products Itching     Omeprazole GI Disturbance       Subjective: Jeanette is a 74 year old female who presents to the clinic today for a follow up of right plantar fasciitis. She relates that she is having a decrease in the amount of pain that she is having. She has resumed PT.     Objective  Data Unavailable Data Unavailable Data Unavailable Data Unavailable Data Unavailable 0 lbs 0 oz  Some pain noted with palpation of the medial attachment of the plantar fascia on the right foot. No pain along the courses of the PT, peroneal, or Achilles tendons on the right.     Assessment: Right plantar fasciitis - improving.     Plan:   - Pt seen and evaluated  - She declines injection today.   - Would like a new pair of orthotics. These were molded and sent to the lab.   - Cont PT.   - Pt to return to clinic PRN.     Again, thank you for allowing me to participate in the care of your patient.      Sincerely,    Patel Taylor DPM

## 2019-08-30 NOTE — NURSING NOTE
Reason For Visit:   Chief Complaint   Patient presents with     RECHECK     1 month follow up  right plantar pain would like new inserts       There were no vitals taken for this visit.    Pain Assessment  Patient Currently in Pain: Yes  0-10 Pain Scale: 5  Primary Pain Location: Foot(Right)    Chaya Marvin ATC

## 2019-08-30 NOTE — PROGRESS NOTES
Chief Complaint:   Chief Complaint   Patient presents with     RECHECK     1 month follow up  right plantar pain would like new inserts          Allergies   Allergen Reactions     Grapefruit Concentrate Itching     Fish-Derived Products Itching     Omeprazole GI Disturbance         Subjective: Jeanette is a 74 year old female who presents to the clinic today for a follow up of right plantar fasciitis. She relates that she is having a decrease in the amount of pain that she is having. She has resumed PT.     Objective  Data Unavailable Data Unavailable Data Unavailable Data Unavailable Data Unavailable 0 lbs 0 oz  Some pain noted with palpation of the medial attachment of the plantar fascia on the right foot. No pain along the courses of the PT, peroneal, or Achilles tendons on the right.     Assessment: Right plantar fasciitis - improving.     Plan:   - Pt seen and evaluated  - She declines injection today.   - Would like a new pair of orthotics. These were molded and sent to the lab.   - Cont PT.   - Pt to return to clinic PRN.

## 2020-06-02 ENCOUNTER — TRANSFERRED RECORDS (OUTPATIENT)
Dept: HEALTH INFORMATION MANAGEMENT | Facility: CLINIC | Age: 75
End: 2020-06-02

## 2020-06-09 ENCOUNTER — TRANSFERRED RECORDS (OUTPATIENT)
Dept: HEALTH INFORMATION MANAGEMENT | Facility: CLINIC | Age: 75
End: 2020-06-09

## 2020-12-09 ENCOUNTER — ANCILLARY PROCEDURE (OUTPATIENT)
Dept: CARDIOLOGY | Facility: CLINIC | Age: 75
End: 2020-12-09
Attending: NURSE PRACTITIONER
Payer: COMMERCIAL

## 2020-12-09 DIAGNOSIS — R00.2 PALPITATIONS: ICD-10-CM

## 2020-12-09 PROCEDURE — 0296T LEADLESS EKG MONITOR 3 TO 14 DAYS: CPT

## 2020-12-09 PROCEDURE — 0298T LEADLESS EKG MONITOR 3 TO 14 DAYS: CPT | Performed by: INTERNAL MEDICINE

## 2020-12-10 NOTE — PROGRESS NOTES
Per Michell Giraldo NP, patient to have 14 day Zio Patch monitor placed.  Diagnosis: Palpitations  R00.2)   Monitor placed: Yes  Patient Instructed: Yes  Patient verbalized understanding: Yes  Holter # [id number]  Placed by Galina Charlton MA

## 2021-01-16 ENCOUNTER — TRANSFERRED RECORDS (OUTPATIENT)
Dept: HEALTH INFORMATION MANAGEMENT | Facility: CLINIC | Age: 76
End: 2021-01-16

## 2021-01-27 ENCOUNTER — TRANSCRIBE ORDERS (OUTPATIENT)
Dept: OTHER | Age: 76
End: 2021-01-27

## 2021-01-27 DIAGNOSIS — I47.10 SVT (SUPRAVENTRICULAR TACHYCARDIA) (H): ICD-10-CM

## 2021-01-27 DIAGNOSIS — R00.2 PALPITATIONS: Primary | ICD-10-CM

## 2021-01-28 ENCOUNTER — MEDICAL CORRESPONDENCE (OUTPATIENT)
Dept: HEALTH INFORMATION MANAGEMENT | Facility: CLINIC | Age: 76
End: 2021-01-28

## 2021-01-29 ENCOUNTER — TRANSCRIBE ORDERS (OUTPATIENT)
Dept: OTHER | Age: 76
End: 2021-01-29

## 2021-01-29 DIAGNOSIS — K21.9 GASTROESOPHAGEAL REFLUX DISEASE WITHOUT ESOPHAGITIS: Primary | ICD-10-CM

## 2021-02-02 ENCOUNTER — APPOINTMENT (OUTPATIENT)
Dept: INTERPRETER SERVICES | Facility: CLINIC | Age: 76
End: 2021-02-02
Payer: COMMERCIAL

## 2021-02-08 NOTE — TELEPHONE ENCOUNTER
Action    Action Taken 2-8: Requested 1-16-21, 6-9-20 EKGs from [facility]  2-8: Requestred 1-20-21 EKG from [facility]  2-17: sent EKGs from [facility] to scanning, sent second request for EKG from [facility]  2-18: Sent EKG from [facility] to urgent scanning

## 2021-02-19 ENCOUNTER — PRE VISIT (OUTPATIENT)
Dept: CARDIOLOGY | Facility: CLINIC | Age: 76
End: 2021-02-19

## 2021-02-19 ENCOUNTER — OFFICE VISIT (OUTPATIENT)
Dept: CARDIOLOGY | Facility: CLINIC | Age: 76
End: 2021-02-19
Attending: NURSE PRACTITIONER
Payer: COMMERCIAL

## 2021-02-19 VITALS
DIASTOLIC BLOOD PRESSURE: 86 MMHG | BODY MASS INDEX: 29.77 KG/M2 | WEIGHT: 168 LBS | HEART RATE: 81 BPM | OXYGEN SATURATION: 98 % | HEIGHT: 63 IN | SYSTOLIC BLOOD PRESSURE: 142 MMHG

## 2021-02-19 DIAGNOSIS — R00.2 PALPITATIONS: ICD-10-CM

## 2021-02-19 DIAGNOSIS — I47.10 SVT (SUPRAVENTRICULAR TACHYCARDIA) (H): Primary | ICD-10-CM

## 2021-02-19 PROCEDURE — 99205 OFFICE O/P NEW HI 60 MIN: CPT | Mod: 25 | Performed by: INTERNAL MEDICINE

## 2021-02-19 PROCEDURE — 93005 ELECTROCARDIOGRAM TRACING: CPT

## 2021-02-19 PROCEDURE — G0463 HOSPITAL OUTPT CLINIC VISIT: HCPCS | Mod: 25

## 2021-02-19 ASSESSMENT — PAIN SCALES - GENERAL: PAINLEVEL: NO PAIN (0)

## 2021-02-19 ASSESSMENT — MIFFLIN-ST. JEOR: SCORE: 1216.17

## 2021-02-19 NOTE — LETTER
2/19/2021      RE: Jeanette Mccurdy  [address]  [address]  [place] [address]       Dear Colleague,    Thank you for the opportunity to participate in the care of your patient, Jeanette Mccurdy, at the [facility] [place] [facility]. Please see a copy of my visit note below.    HPI:  Ms. Mccurdy is a 77 yo Female with a PMH of HTN, hepatitis C, Euthyroid sick syndrome, GERD, and palpitation.  She was referred for a cardiology evaluation.  She wore Zio patch in 12/2020, which revealed SVT up to 14 beats.  She was asymptomatic with SVTs, and her events were associated with sinus rhythm with and without PACs.  She cannot speak English, and we had a tele-.  She complained of heart racing and pounding after meals with acid reflex or taking BP med.  The symptoms started a year ago. She also complained of SOB, hearburn and chest pressure, but Ex never causes those symptoms.  Her symptoms are occurring a couple of days a week.    PAST MEDICAL HISTORY:  Past Medical History:   Diagnosis Date     Hypertension        CURRENT MEDICATIONS:  Current Outpatient Medications   Medication Sig Dispense Refill     ranitidine (ZANTAC) 150 MG tablet Take 150 mg by mouth       celecoxib (CELEBREX) 100 MG capsule Take 1 capsule (100 mg) by mouth 2 times daily 60 capsule 0     dexamethasone (DECADRON) 4 MG/ML injection Use 4 mg or dose determined by provider for iontophoresis. (Patient not taking: Reported on 2/19/2021) 120 mL 0     hydrochlorothiazide (HYDRODIURIL) 25 MG tablet Take 25 mg by mouth         PAST SURGICAL HISTORY:  No past surgical history on file.    ALLERGIES:     Allergies   Allergen Reactions     Grapefruit Concentrate Itching     Grapefruit Extract Itching     Fish-Derived Products Itching     Omeprazole GI Disturbance     Peanut-Derived Rash     Bumps on tongue       FAMILY HISTORY:  - Premature coronary artery disease  - Atrial  "fibrillation  - Sudden cardiac death     SOCIAL HISTORY:  Social History     Tobacco Use     Smoking status: Never Smoker     Smokeless tobacco: Never Used   Substance Use Topics     Alcohol use: No     Drug use: No       ROS:   12 points of ROS were reviewed.  Constitutional: No fever, chills, or sweats. Weight stable.   ENT: No visual disturbance, ear ache, epistaxis, sore throat.   Cardiovascular: As per HPI.   Respiratory: No cough, hemoptysis.    GI: No nausea, vomiting, hematemesis, melena, or hematochezia.   : No hematuria.   Integument: Negative.   Psychiatric: Negative.   Hematologic:  Easy bruising, no easy bleeding.  Neuro: Negative.   Endocrinology: No significant heat or cold intolerance   Musculoskeletal: No myalgia.    Exam:  BP (!) 142/86 (BP Location: Right arm, Patient Position: Chair, Cuff Size: Adult Regular)   Pulse 81   Ht 1.6 m (5' 3\")   Wt 76.2 kg (168 lb)   SpO2 98%   BMI 29.76 kg/m    GENERAL APPEARANCE: healthy, alert and no distress  HEENT: no icterus, no xanthelasmas, normal pupil size and reaction, normal palate, mucosa moist, no central cyanosis  NECK: no adenopathy, no asymmetry, masses, or scars, thyroid normal to palpation and no bruits, JVP not elevated  RESPIRATORY: lungs clear to auscultation - no rales, rhonchi or wheezes, no use of accessory muscles, no retractions, respirations are unlabored, normal respiratory rate  CARDIOVASCULAR: regular rhythm, normal S1 with physiologic split S2, no S3 or S4 and no murmur, click or rub, precordium quiet with normal PMI.  ABDOMEN: soft, non tender, without hepatosplenomegaly, no masses palpable, bowel sounds normal, aorta not enlarged by palpation, no abdominal bruits  EXTREMITIES: peripheral pulses normal, no edema, no bruits  NEURO: alert and oriented to person/place/time, normal speech, gait and affect  VASC: Radial, femoral, dorsalis pedis and posterior tibialis pulses are normal in volumes and symmetric bilaterally. No bruits " are heard.  SKIN: no ecchymoses, no rashes    Labs:  CBC RESULTS:   No results found for: WBC, RBC, HGB, HCT, MCV, MCH, MCHC, RDW, PLT    BMP RESULTS:  No results found for: NA, POTASSIUM, CHLORIDE, CO2, ANIONGAP, GLC, BUN, CR, GFRESTIMATED, GFRESTBLACK, BASIM     INR RESULTS:  No results found for: INR    Procedures:  Zio patch in 12/2020: Reviewed.        ECG on 2/19/2021: Reviewed.  WNL.    Assessment and Plan:  # HTN  # Hepatitis C  # Euthyroid sick syndrome  # GERD  # Palpitation.  Zio patch in 12/2020 revealed SVT up to 14 beats, likely PAC runs.  She was asymptomatic with SVTs, and her events were associated with sinus rhythm with and without PACs.  I explained to the patient that her SVTs are benign and would not require any treatment at this point.  I advised her to see a GI doctor due to heartburn that triggers her palpitation.  I also advised her to call us if she has more palpitation or different type of palpitation.  No regular follow up will be required.    I spent a total of 61 min to review the records, see the patient, and complete the documents.    CC  Patient Care Team:  Michell Giraldo, JON as PCP - General  Patel Taylor DPM as MD (Podiatry)  Patel Taylor DPM as Assigned Musculoskeletal Provider  Anjali Ny, RN as Specialty Care Coordinator (Cardiology)  Gurmeet Mcknight MD (Cardiovascular Disease)  MICHELL GIRALDO        Please do not hesitate to contact me if you have any questions/concerns.     Sincerely,     Gurmeet Mcknight MD

## 2021-02-19 NOTE — PATIENT INSTRUCTIONS
You were seen in Electrophysiology today by: Dr Mcknight    Plan:     Follow up visit: as needed    Further Instructions: see GI for acid reflux      Your Care Team:  EP Cardiology   Telephone Number     Anjali Ny RN [phone]     For scheduling appts or procedures:    [facility]   [phone]   For the Device Clinic (Pacemakers, ICDs, Loop Recorders)    During business hours: [phone]  After business hours:   [phone]- select option 4 and ask for job code [phone].       Cardiovascular Clinic:   [address] [place] [address]. [place] [address]      As always, Thank you for trusting us with your health care needs!

## 2021-02-19 NOTE — NURSING NOTE
Chief Complaint   Patient presents with     New Patient     Palpitations, SVT on zio up to 14 beats, asymptomatic. Sx with NSR or rare PAC. Referred by [facility] clinic     Vitals were taken, medications reconciled and EKG performed.     LIAM Garcia  2:26 PM

## 2021-02-19 NOTE — PROGRESS NOTES
HPI:  Ms. Mccurdy is a 75 yo Female with a PMH of HTN, hepatitis C, Euthyroid sick syndrome, GERD, and palpitation.  She was referred for a cardiology evaluation.  She wore Zio patch in 12/2020, which revealed SVT up to 14 beats.  She was asymptomatic with SVTs, and her events were associated with sinus rhythm with and without PACs.  She cannot speak English, and we had a tele-.  She complained of heart racing and pounding after meals with acid reflex or taking BP med.  The symptoms started a year ago. She also complained of SOB, hearburn and chest pressure, but Ex never causes those symptoms.  Her symptoms are occurring a couple of days a week.    PAST MEDICAL HISTORY:  Past Medical History:   Diagnosis Date     Hypertension        CURRENT MEDICATIONS:  Current Outpatient Medications   Medication Sig Dispense Refill     ranitidine (ZANTAC) 150 MG tablet Take 150 mg by mouth       celecoxib (CELEBREX) 100 MG capsule Take 1 capsule (100 mg) by mouth 2 times daily 60 capsule 0     dexamethasone (DECADRON) 4 MG/ML injection Use 4 mg or dose determined by provider for iontophoresis. (Patient not taking: Reported on 2/19/2021) 120 mL 0     hydrochlorothiazide (HYDRODIURIL) 25 MG tablet Take 25 mg by mouth         PAST SURGICAL HISTORY:  No past surgical history on file.    ALLERGIES:     Allergies   Allergen Reactions     Grapefruit Concentrate Itching     Grapefruit Extract Itching     Fish-Derived Products Itching     Omeprazole GI Disturbance     Peanut-Derived Rash     Bumps on tongue       FAMILY HISTORY:  - Premature coronary artery disease  - Atrial fibrillation  - Sudden cardiac death     SOCIAL HISTORY:  Social History     Tobacco Use     Smoking status: Never Smoker     Smokeless tobacco: Never Used   Substance Use Topics     Alcohol use: No     Drug use: No       ROS:   12 points of ROS were reviewed.  Constitutional: No fever, chills, or sweats. Weight stable.   ENT: No visual disturbance, ear ache,  "epistaxis, sore throat.   Cardiovascular: As per HPI.   Respiratory: No cough, hemoptysis.    GI: No nausea, vomiting, hematemesis, melena, or hematochezia.   : No hematuria.   Integument: Negative.   Psychiatric: Negative.   Hematologic:  Easy bruising, no easy bleeding.  Neuro: Negative.   Endocrinology: No significant heat or cold intolerance   Musculoskeletal: No myalgia.    Exam:  BP (!) 142/86 (BP Location: Right arm, Patient Position: Chair, Cuff Size: Adult Regular)   Pulse 81   Ht 1.6 m (5' 3\")   Wt 76.2 kg (168 lb)   SpO2 98%   BMI 29.76 kg/m    GENERAL APPEARANCE: healthy, alert and no distress  HEENT: no icterus, no xanthelasmas, normal pupil size and reaction, normal palate, mucosa moist, no central cyanosis  NECK: no adenopathy, no asymmetry, masses, or scars, thyroid normal to palpation and no bruits, JVP not elevated  RESPIRATORY: lungs clear to auscultation - no rales, rhonchi or wheezes, no use of accessory muscles, no retractions, respirations are unlabored, normal respiratory rate  CARDIOVASCULAR: regular rhythm, normal S1 with physiologic split S2, no S3 or S4 and no murmur, click or rub, precordium quiet with normal PMI.  ABDOMEN: soft, non tender, without hepatosplenomegaly, no masses palpable, bowel sounds normal, aorta not enlarged by palpation, no abdominal bruits  EXTREMITIES: peripheral pulses normal, no edema, no bruits  NEURO: alert and oriented to person/place/time, normal speech, gait and affect  VASC: Radial, femoral, dorsalis pedis and posterior tibialis pulses are normal in volumes and symmetric bilaterally. No bruits are heard.  SKIN: no ecchymoses, no rashes    Labs:  CBC RESULTS:   No results found for: WBC, RBC, HGB, HCT, MCV, MCH, MCHC, RDW, PLT    BMP RESULTS:  No results found for: NA, POTASSIUM, CHLORIDE, CO2, ANIONGAP, GLC, BUN, CR, GFRESTIMATED, GFRESTBLACK, BASIM     INR RESULTS:  No results found for: INR    Procedures:  Zio patch in 12/2020: Reviewed.        ECG " on 2/19/2021: Reviewed.  WNL.    Assessment and Plan:  # HTN  # Hepatitis C  # Euthyroid sick syndrome  # GERD  # Palpitation.  Zio patch in 12/2020 revealed SVT up to 14 beats, likely PAC runs.  She was asymptomatic with SVTs, and her events were associated with sinus rhythm with and without PACs.  I explained to the patient that her SVTs are benign and would not require any treatment at this point.  I advised her to see a GI doctor due to heartburn that triggers her palpitation.  I also advised her to call us if she has more palpitation or different type of palpitation.  No regular follow up will be required.    I spent a total of 61 min to review the records, see the patient, and complete the documents.    CC  Patient Care Team:  Michell Giraldo NP as PCP - General  Patel Taylor DPM as MD (Podiatry)  Patel Taylor DPM as Assigned Musculoskeletal Provider  Anjali Ny, RN as Specialty Care Coordinator (Cardiology)  Gurmeet Mcknight MD (Cardiovascular Disease)  MICHELL GIRALDO

## 2021-02-21 LAB — INTERPRETATION ECG - MUSE: NORMAL

## 2021-02-23 NOTE — TELEPHONE ENCOUNTER
REFERRAL INFORMATION:    Referring Provider:  Mihcell Giraldo NP    Referring Clinic:  [facility]    Reason for Visit/Diagnosis: GERD or Reflux     FUTURE VISIT INFORMATION:    Appointment Date: 3/16/2021    Appointment Time: 9 AM      NOTES STATUS DETAILS   OFFICE NOTE from Referring Provider Care Everywhere 1/28/2021, 1/11/2021, 9/8/2020 Office visit with SUNIL Johnson CNP     ** More office visits in CE   OFFICE NOTE from Other Specialist N/A    HOSPITAL DISCHARGE SUMMARY/  ED VISITS N/A    OPERATIVE REPORT N/A    MEDICATION LIST Internal         ENDOSCOPY  Internal EGD: 3/12/2021 ** scheduled   COLONOSCOPY N/A    ERCP N/A    EUS N/A    STOOL TESTING N/A    PERTINENT LABS Care Everywhere    PATHOLOGY REPORTS (RELATED) N/A    IMAGING (CT, MRI, EGD, MRCP, Small Bowel Follow Through/SBT, MR/CT Enterography) Care Everywhere [facility]:  - CT Abdomen Pelvis: 1/16/2021     3/5/2021 3:38pm Fax request sent to [facility] ([phone]) for images. -Kvng    3/12/2021 Image resolve into PACS. -Kvng

## 2021-02-24 DIAGNOSIS — Z11.59 ENCOUNTER FOR SCREENING FOR OTHER VIRAL DISEASES: ICD-10-CM

## 2021-03-01 ENCOUNTER — APPOINTMENT (OUTPATIENT)
Dept: INTERPRETER SERVICES | Facility: CLINIC | Age: 76
End: 2021-03-01
Payer: COMMERCIAL

## 2021-03-11 ENCOUNTER — APPOINTMENT (OUTPATIENT)
Dept: INTERPRETER SERVICES | Facility: CLINIC | Age: 76
End: 2021-03-11
Payer: COMMERCIAL

## 2021-03-12 ENCOUNTER — ANESTHESIA EVENT (OUTPATIENT)
Dept: SURGERY | Facility: AMBULATORY SURGERY CENTER | Age: 76
End: 2021-03-12

## 2021-03-12 ENCOUNTER — ANESTHESIA (OUTPATIENT)
Dept: SURGERY | Facility: AMBULATORY SURGERY CENTER | Age: 76
End: 2021-03-12

## 2021-03-12 ENCOUNTER — HOSPITAL ENCOUNTER (OUTPATIENT)
Facility: AMBULATORY SURGERY CENTER | Age: 76
Discharge: HOME | End: 2021-03-12
Attending: INTERNAL MEDICINE | Admitting: INTERNAL MEDICINE
Payer: COMMERCIAL

## 2021-03-12 VITALS
BODY MASS INDEX: 29.77 KG/M2 | OXYGEN SATURATION: 100 % | HEART RATE: 74 BPM | DIASTOLIC BLOOD PRESSURE: 74 MMHG | WEIGHT: 168 LBS | HEIGHT: 63 IN | TEMPERATURE: 97.4 F | SYSTOLIC BLOOD PRESSURE: 144 MMHG | RESPIRATION RATE: 18 BRPM

## 2021-03-12 VITALS — HEART RATE: 68 BPM

## 2021-03-12 DIAGNOSIS — Q39.6: Primary | ICD-10-CM

## 2021-03-12 LAB — UPPER GI ENDOSCOPY: NORMAL

## 2021-03-12 PROCEDURE — 43239 EGD BIOPSY SINGLE/MULTIPLE: CPT

## 2021-03-12 PROCEDURE — 88342 IMHCHEM/IMCYTCHM 1ST ANTB: CPT | Performed by: PATHOLOGY

## 2021-03-12 PROCEDURE — 88305 TISSUE EXAM BY PATHOLOGIST: CPT | Performed by: PATHOLOGY

## 2021-03-12 RX ORDER — PROCHLORPERAZINE MALEATE 5 MG
5 TABLET ORAL EVERY 6 HOURS PRN
Status: DISCONTINUED | OUTPATIENT
Start: 2021-03-12 | End: 2021-03-13 | Stop reason: HOSPADM

## 2021-03-12 RX ORDER — NALOXONE HYDROCHLORIDE 0.4 MG/ML
0.2 INJECTION, SOLUTION INTRAMUSCULAR; INTRAVENOUS; SUBCUTANEOUS
Status: DISCONTINUED | OUTPATIENT
Start: 2021-03-12 | End: 2021-03-13 | Stop reason: HOSPADM

## 2021-03-12 RX ORDER — SODIUM CHLORIDE, SODIUM LACTATE, POTASSIUM CHLORIDE, CALCIUM CHLORIDE 600; 310; 30; 20 MG/100ML; MG/100ML; MG/100ML; MG/100ML
INJECTION, SOLUTION INTRAVENOUS CONTINUOUS PRN
Status: DISCONTINUED | OUTPATIENT
Start: 2021-03-12 | End: 2021-03-12

## 2021-03-12 RX ORDER — LEVOTHYROXINE SODIUM 25 UG/1
TABLET ORAL
COMMUNITY
Start: 2021-02-25

## 2021-03-12 RX ORDER — LIDOCAINE 40 MG/G
CREAM TOPICAL
Status: DISCONTINUED | OUTPATIENT
Start: 2021-03-12 | End: 2021-03-12 | Stop reason: HOSPADM

## 2021-03-12 RX ORDER — LIDOCAINE HYDROCHLORIDE 20 MG/ML
INJECTION, SOLUTION INFILTRATION; PERINEURAL PRN
Status: DISCONTINUED | OUTPATIENT
Start: 2021-03-12 | End: 2021-03-12

## 2021-03-12 RX ORDER — NALOXONE HYDROCHLORIDE 0.4 MG/ML
0.4 INJECTION, SOLUTION INTRAMUSCULAR; INTRAVENOUS; SUBCUTANEOUS
Status: DISCONTINUED | OUTPATIENT
Start: 2021-03-12 | End: 2021-03-13 | Stop reason: HOSPADM

## 2021-03-12 RX ORDER — PROPOFOL 10 MG/ML
INJECTION, EMULSION INTRAVENOUS PRN
Status: DISCONTINUED | OUTPATIENT
Start: 2021-03-12 | End: 2021-03-12

## 2021-03-12 RX ORDER — LORATADINE 10 MG/1
10 TABLET ORAL
COMMUNITY
Start: 2020-11-30

## 2021-03-12 RX ORDER — OMEPRAZOLE 40 MG/1
CAPSULE, DELAYED RELEASE ORAL
COMMUNITY
Start: 2021-02-25

## 2021-03-12 RX ORDER — ONDANSETRON 2 MG/ML
4 INJECTION INTRAMUSCULAR; INTRAVENOUS EVERY 6 HOURS PRN
Status: DISCONTINUED | OUTPATIENT
Start: 2021-03-12 | End: 2021-03-13 | Stop reason: HOSPADM

## 2021-03-12 RX ORDER — FLUMAZENIL 0.1 MG/ML
0.2 INJECTION, SOLUTION INTRAVENOUS
Status: DISCONTINUED | OUTPATIENT
Start: 2021-03-12 | End: 2021-03-13 | Stop reason: HOSPADM

## 2021-03-12 RX ORDER — SIMETHICONE
LIQUID (ML) MISCELLANEOUS PRN
Status: DISCONTINUED | OUTPATIENT
Start: 2021-03-12 | End: 2021-03-12 | Stop reason: HOSPADM

## 2021-03-12 RX ORDER — ONDANSETRON 2 MG/ML
4 INJECTION INTRAMUSCULAR; INTRAVENOUS
Status: DISCONTINUED | OUTPATIENT
Start: 2021-03-12 | End: 2021-03-12 | Stop reason: HOSPADM

## 2021-03-12 RX ORDER — LOSARTAN POTASSIUM 25 MG/1
25 TABLET ORAL DAILY
COMMUNITY

## 2021-03-12 RX ORDER — PROPOFOL 10 MG/ML
INJECTION, EMULSION INTRAVENOUS CONTINUOUS PRN
Status: DISCONTINUED | OUTPATIENT
Start: 2021-03-12 | End: 2021-03-12

## 2021-03-12 RX ORDER — ONDANSETRON 4 MG/1
4 TABLET, ORALLY DISINTEGRATING ORAL EVERY 6 HOURS PRN
Status: DISCONTINUED | OUTPATIENT
Start: 2021-03-12 | End: 2021-03-13 | Stop reason: HOSPADM

## 2021-03-12 RX ADMIN — SODIUM CHLORIDE, SODIUM LACTATE, POTASSIUM CHLORIDE, CALCIUM CHLORIDE: 600; 310; 30; 20 INJECTION, SOLUTION INTRAVENOUS at 14:54

## 2021-03-12 RX ADMIN — LIDOCAINE HYDROCHLORIDE 50 MG: 20 INJECTION, SOLUTION INFILTRATION; PERINEURAL at 15:01

## 2021-03-12 RX ADMIN — PROPOFOL 150 MCG/KG/MIN: 10 INJECTION, EMULSION INTRAVENOUS at 15:01

## 2021-03-12 RX ADMIN — PROPOFOL 40 MG: 10 INJECTION, EMULSION INTRAVENOUS at 15:05

## 2021-03-12 ASSESSMENT — MIFFLIN-ST. JEOR: SCORE: 1216.04

## 2021-03-12 NOTE — H&P
Jeanette Kaz  [id number]  female  76 year old      Reason for procedure/surgery: bloating, abdominal discomfort    Patient Active Problem List   Diagnosis     Plantar fasciitis     Pes planus of both feet     Elevated liver function tests     Essential hypertension     Hepatitis C antibody test positive     LTBI (latent tuberculosis infection)     Reflux gastritis       Past Surgical History:  History reviewed. No pertinent surgical history.    Past Medical History:   Past Medical History:   Diagnosis Date     Hypertension        Social History:   Social History     Tobacco Use     Smoking status: Never Smoker     Smokeless tobacco: Never Used   Substance Use Topics     Alcohol use: No       Family History: History reviewed. No pertinent family history.    Allergies:   Allergies   Allergen Reactions     Grapefruit Concentrate Itching     Grapefruit Extract Itching     Fish-Derived Products Itching     Omeprazole GI Disturbance     Peanut-Derived Rash     Bumps on tongue       Active Medications:   Current Outpatient Medications   Medication Sig Dispense Refill     levothyroxine (SYNTHROID/LEVOTHROID) 25 MCG tablet        loratadine (CLARITIN) 10 MG tablet Take 10 mg by mouth       losartan (COZAAR) 25 MG tablet Take 25 mg by mouth daily       omeprazole (PRILOSEC) 40 MG DR capsule        celecoxib (CELEBREX) 100 MG capsule Take 1 capsule (100 mg) by mouth 2 times daily 60 capsule 0     dexamethasone (DECADRON) 4 MG/ML injection Use 4 mg or dose determined by provider for iontophoresis. (Patient not taking: Reported on 2/19/2021) 120 mL 0       Systemic Review:   CONSTITUTIONAL: NEGATIVE for fever, chills, change in weight  ENT/MOUTH: NEGATIVE for ear, mouth and throat problems  RESP: NEGATIVE for significant cough or SOB  CV: NEGATIVE for chest pain, palpitations or peripheral edema    Physical Examination:   Vital Signs: BP (!) 178/98   Pulse 83   Temp 97.1  F (36.2  C) (Temporal)   Resp 16   Ht 1.6 m (5'  "2.99\")   Wt 76.2 kg (168 lb)   SpO2 99%   BMI 29.77 kg/m    GENERAL: healthy, alert and no distress  NECK: no adenopathy, no asymmetry, masses, or scars  RESP: lungs clear to auscultation - no rales, rhonchi or wheezes  CV: regular rate and rhythm, normal S1 S2, no S3 or S4, no murmur, click or rub, no peripheral edema and peripheral pulses strong  ABDOMEN: soft, nontender, no hepatosplenomegaly, no masses and bowel sounds normal  MS: no gross musculoskeletal defects noted, no edema    Plan: Appropriate to proceed as scheduled.      Tim Minaya MD  3/12/2021    PCP:  Michell Giraldo    "

## 2021-03-12 NOTE — ANESTHESIA PREPROCEDURE EVALUATION
Anesthesia Pre-Procedure Evaluation    Patient: Jeanette Mccurdy   MRN: [id number] : 1945        Preoperative Diagnosis: Gastroesophageal reflux disease without esophagitis [K21.9]   Procedure : Procedure(s):  ESOPHAGOGASTRODUODENOSCOPY (EGD)     Past Medical History:   Diagnosis Date     Hypertension       History reviewed. No pertinent surgical history.   Allergies   Allergen Reactions     Grapefruit Concentrate Itching     Grapefruit Extract Itching     Fish-Derived Products Itching     Omeprazole GI Disturbance     Peanut-Derived Rash     Bumps on tongue      Social History     Tobacco Use     Smoking status: Never Smoker     Smokeless tobacco: Never Used   Substance Use Topics     Alcohol use: No      Wt Readings from Last 1 Encounters:   21 76.2 kg (168 lb)        Anesthesia Evaluation            ROS/MED HX  ENT/Pulmonary:  - neg pulmonary ROS     Neurologic:  - neg neurologic ROS     Cardiovascular:     (+) hypertension-----Previous cardiac testing   Echo: Date: Results:    Stress Test: Date: Results:    ECG Reviewed: Date: 21 Results:  NSR  Cath: Date: Results:      METS/Exercise Tolerance:     Hematologic:  - neg hematologic  ROS     Musculoskeletal:  - neg musculoskeletal ROS     GI/Hepatic:     (+) GERD, hepatitis type C,     Renal/Genitourinary:  - neg Renal ROS     Endo:  - neg endo ROS     Psychiatric/Substance Use:  - neg psychiatric ROS     Infectious Disease:  - neg infectious disease ROS     Malignancy:  - neg malignancy ROS     Other:  - neg other ROS          Physical Exam    Airway  airway exam normal           Respiratory Devices and Support         Dental  no notable dental history         Cardiovascular   cardiovascular exam normal          Pulmonary   pulmonary exam normal                OUTSIDE LABS:  CBC: No results found for: WBC, HGB, HCT, PLT  BMP: No results found for: NA, POTASSIUM, CHLORIDE, CO2, BUN, CR, GLC  COAGS: No results found for: PTT, INR, FIBR  POC: No  results found for: BGM, HCG, HCGS  HEPATIC: No results found for: ALBUMIN, PROTTOTAL, ALT, AST, GGT, ALKPHOS, BILITOTAL, BILIDIRECT, ALONSO  OTHER: No results found for: PH, LACT, A1C, BASIM, PHOS, MAG, LIPASE, AMYLASE, TSH, T4, T3, CRP, SED    Anesthesia Plan    ASA Status:  2   NPO Status:  NPO Appropriate    Anesthesia Type: MAC.     - Reason for MAC: straight local not clinically adequate   Induction: Intravenous, Propofol.   Maintenance: TIVA.        Consents    Anesthesia Plan(s) and associated risks, benefits, and realistic alternatives discussed. Questions answered and patient/representative(s) expressed understanding.     - Discussed with:  Patient      - Extended Intubation/Ventilatory Support Discussed: No.      - Patient is DNR/DNI Status: No    Use of blood products discussed: No .     Postoperative Care    Pain management: IV analgesics, Multi-modal analgesia.   PONV prophylaxis: Ondansetron (or other 5HT-3), Background Propofol Infusion     Comments:                Alvaro Macias MD

## 2021-03-12 NOTE — ANESTHESIA POSTPROCEDURE EVALUATION
Patient: Jeanette Mccurdy    Procedure(s):  ESOPHAGOGASTRODUODENOSCOPY, WITH BIOPSY    Diagnosis:Gastroesophageal reflux disease without esophagitis [K21.9]  Diagnosis Additional Information: No value filed.    Anesthesia Type:  MAC    Note:  Disposition: Outpatient   Postop Pain Control: Uneventful            Sign Out: Well controlled pain   PONV: No   Neuro/Psych: Uneventful            Sign Out: Acceptable/Baseline neuro status   Airway/Respiratory: Uneventful            Sign Out: Acceptable/Baseline resp. status   CV/Hemodynamics: Uneventful            Sign Out: Acceptable CV status   Other NRE: NONE   DID A NON-ROUTINE EVENT OCCUR? No         Last vitals:  Vitals:    03/12/21 1400 03/12/21 1523 03/12/21 1539   BP: (!) 171/78 124/66 (!) 144/74   Pulse:  85 74   Resp:  18 18   Temp:  36.3  C (97.4  F) 36.3  C (97.4  F)   SpO2:  100% 100%       Last vitals prior to Anesthesia Care Transfer:  CRNA VITALS  3/12/2021 1446 - 3/12/2021 1546      3/12/2021             SpO2:  100 %          Electronically Signed By: Alvaro Macias MD  March 12, 2021  3:58 PM

## 2021-03-12 NOTE — LETTER
March 28, 2021      Jeanette Mccurdy  [address]    [place] [address]        Dear ,    Here are your biopsy results.     There was mild inflammation in the stomach. This is a fairly common finding and can be related to things like alcohol, tobacco, medications (especially NSAIDs), stress and other chronic illnesses. Importantly, there was no evidence of an infection called H pylori.    We will see the results of your imaging studies this week.     Sincerely,     Tim Chappell MD    [facility]  Division of Gastroenterology    Resulted Orders   Surgical pathology exam   Result Value Ref Range    Copath Report       Patient Name: JEANETTE MCCURDY  MR#: [id number]  Specimen #: [id number]  Collected: 3/12/2021  Received: 3/12/2021  Reported: 3/18/2021 11:11  Ordering Phy(s): TIM CHAPPELL    For improved result formatting, select 'View Enhanced Report Format' under   Linked Documents section.    SPECIMEN(S):  A: Duodenal biopsy  B: Gastric biopsy  C: Gastric polyp    FINAL DIAGNOSIS:  A. Duodenal biopsy:  - Duodenal mucosa with no significant histologic abnormality  - No evidence of celiac disease or peptic duodenitis    B. Gastric biopsy:  - Mild chronic gastritis  - Features of reactive gastropathy  - No H. pylori like organisms identified on routine staining or by   immunohistochemistry  - Negative for intestinal metaplasia or dysplasia    C. Gastric polyp, biopsies:  - Gastric mucosa with polypoid foveolar hyperplasia and lymphoid   aggregates  - No evidence of intestinal metaplasia or dysplasia    I have personally reviewed all specimens and/or slides, including the   listed special stains, and  used them  with my medical judgement to determine or confirm the final diagnosis.    Electronically signed out by:    Geri Gomez M.D., [facility]    CLINICAL HISTORY:  GERD without esophagitis.  Multiple gastric polyps.  Normal duodenal bulb   and second  "portion of the duodenum.    GROSS:  A: The specimen is received in formalin with proper patient   identification, labeled \"duodenal biopsies\".  The  specimen consists of three irregular tan pieces of soft tissue averaging   0.2 cm in greatest dimension which  are entirely submitted in cassette A1.    B: The specimen is received in formalin with proper patient   identification, labeled \"gastric biopsies\".  The  specimen consists of five irregular tan pieces of soft tissue averaging   0.2 cm in greatest dimension which are  entirely submitted in cassette B1.    C: The specimen is received in formalin with proper patient   identification, labeled \"gastric polyp biopsies\".  The specimen consists of five irregular tan pieces of sof t tissue   averaging 0.2 cm in greatest dimension which  are entirely submitted in cassette C1. (Dictated by: Rahul Bergeron   3/12/2021 04:13 PM)    MICROSCOPIC:  Microscopic examination was performed.    The technical component of this testing was completed at the [facility], with the professional component performed   at the [facility], [address] [place] [id number] ([phone])    CPT Codes:  A: 29364-OS2, 73785-EYB  B: 72578-FE5, 18587-RNZ  C: 46937-RL7    COLLECTION SITE:  Client: [facility]  Location: [place] ([place])               "

## 2021-03-12 NOTE — ANESTHESIA CARE TRANSFER NOTE
Patient: Jeanette Siyad    Procedure(s):  ESOPHAGOGASTRODUODENOSCOPY, WITH BIOPSY    Diagnosis: Gastroesophageal reflux disease without esophagitis [K21.9]  Diagnosis Additional Information: No value filed.    Anesthesia Type:   MAC     Note:    Oropharynx: spontaneously breathing  Level of Consciousness: awake  Oxygen Supplementation: room air    Independent Airway: airway patency satisfactory and stable  Dentition: dentition unchanged  Vital Signs Stable: post-procedure vital signs reviewed and stable  Report to RN Given: handoff report given  Patient transferred to: Phase II  Comments: 124/66  99%  96.3-80-16    Handoff Report: Identifed the Patient, Identified the Reponsible Provider, Reviewed the pertinent medical history, Discussed the surgical course, Reviewed Intra-OP anesthesia mangement and issues during anesthesia, Set expectations for post-procedure period and Allowed opportunity for questions and acknowledgement of understanding      Vitals: (Last set prior to Anesthesia Care Transfer)  CRNA VITALS  3/12/2021 1446 - 3/12/2021 1524      3/12/2021             SpO2:  100 %        Electronically Signed By: SUNIL Nuñez CRNA  March 12, 2021  3:24 PM

## 2021-03-12 NOTE — OR NURSING
Dr. Minaya talked to patient and family member in room using the  on a stick, language Bangladeshi.  I used the Bangladeshi  to go over discharge instructions. All questions answered.  Patient's family will call for cab ride home when patient is dressed.

## 2021-03-16 ENCOUNTER — PRE VISIT (OUTPATIENT)
Dept: GASTROENTEROLOGY | Facility: CLINIC | Age: 76
End: 2021-03-16

## 2021-03-16 ENCOUNTER — VIRTUAL VISIT (OUTPATIENT)
Dept: GASTROENTEROLOGY | Facility: CLINIC | Age: 76
End: 2021-03-16
Attending: NURSE PRACTITIONER
Payer: COMMERCIAL

## 2021-03-16 VITALS — BODY MASS INDEX: 29.06 KG/M2 | WEIGHT: 164 LBS | HEIGHT: 63 IN

## 2021-03-16 DIAGNOSIS — R10.84 ABDOMINAL PAIN, GENERALIZED: Primary | ICD-10-CM

## 2021-03-16 DIAGNOSIS — K59.00 CONSTIPATION, UNSPECIFIED CONSTIPATION TYPE: ICD-10-CM

## 2021-03-16 DIAGNOSIS — K21.9 GASTROESOPHAGEAL REFLUX DISEASE WITHOUT ESOPHAGITIS: ICD-10-CM

## 2021-03-16 PROCEDURE — 99203 OFFICE O/P NEW LOW 30 MIN: CPT | Mod: 95 | Performed by: INTERNAL MEDICINE

## 2021-03-16 RX ORDER — POLYETHYLENE GLYCOL 3350 17 G/17G
1 POWDER, FOR SOLUTION ORAL DAILY
Qty: 1530 G | Refills: 3 | Status: SHIPPED | OUTPATIENT
Start: 2021-03-16

## 2021-03-16 RX ORDER — POLYETHYLENE GLYCOL 3350 17 G/17G
1 POWDER, FOR SOLUTION ORAL DAILY
Qty: 1530 G | Refills: 3 | Status: SHIPPED | OUTPATIENT
Start: 2021-03-16 | End: 2021-03-16

## 2021-03-16 RX ORDER — DICYCLOMINE HYDROCHLORIDE 10 MG/1
10 CAPSULE ORAL
Qty: 360 CAPSULE | Refills: 3 | Status: CANCELLED | OUTPATIENT
Start: 2021-03-16 | End: 2022-03-16

## 2021-03-16 ASSESSMENT — PAIN SCALES - GENERAL: PAINLEVEL: NO PAIN (0)

## 2021-03-16 ASSESSMENT — MIFFLIN-ST. JEOR: SCORE: 1198.03

## 2021-03-16 NOTE — NURSING NOTE
"Chief Complaint   Patient presents with     New Patient     GERD or reflux       Vitals:    03/16/21 0831   Weight: 74.4 kg (164 lb)   Height: 1.6 m (5' 3\")       Body mass index is 29.05 kg/m .      Jacques Chavez LPN                        "

## 2021-03-16 NOTE — PATIENT INSTRUCTIONS
It was a pleasure taking care of you today.  I've included a brief summary of our discussion and care plan from today's visit below.  Please review this information with your primary care provider.  _______________________________________________________________________    My recommendations are summarized as follows:    1. Await pathology reports from upper endoscopy performed by Dr. Minaya  2. Schedule colonoscopy to evaluate symptoms of pain and constipation. Additionally you have never undergone colonoscopy and this should be done for colorectal cancer screening purposes.   3. Begin miralax daily for constipation.     To schedule endoscopic procedures you may call: [phone]  To schedule radiology tests you may call: [phone]  To schedule an ENT appointment you may call: [phone]    Please call our nurse Korina with any questions or concerns- [phone].  --    Return to GI Clinic in 6 months to review your progress.    _______________________________________________________________________    Who do I call with any questions after my visit?  Please be in touch if there are any further questions that arise following today's visit.  There are multiple ways to contact your gastroenterology care team.        During business hours, you may reach a Gastroenterology nurse at [phone] and choose option 3.         To schedule or reschedule an appointment, please call [phone].       You can always send a secure message through [employer].  [employer] messages are answered by your nurse or doctor typically within 24 hours.  Please allow extra time on weekends and holidays.        For urgent/emergent questions after business hours, you may reach the on-call GI Fellow by contacting the [facility] at [phone].     How will I get the results of any tests ordered?    You will receive all of your results.  If you have signed up for [employer], any tests ordered at your visit will be available  to you after your physician reviews them.  Typically this takes 1-2 weeks.  If there are urgent results that require a change in your care plan, your physician or nurse will call you to discuss the next steps.      What is [employer]?  "[employer] is a secure way for you to access all of your healthcare records from the [facility].  It is a web based computer program, so you can sign on to it from any location.  It also allows you to send secure messages to your care team.  I recommend signing up for "[employer] access if you have not already done so and are comfortable with using a computer.      How to I schedule a follow-up visit?  If you did not schedule a follow-up visit today, please call [phone] to schedule a follow-up office visit.        Sincerely,    Tony Terry DO   of Medicine  Division of Gastroenterology, Hepatology, and Nutrition  [facility]

## 2021-03-16 NOTE — PROGRESS NOTES
Jeanette is a 76 year old who is being evaluated via a billable telephone visit.      What phone number would you like to be contacted at? [phone].  : [phone]  How would you like to obtain your AVS? Mail a copy    Gastroenterology Visit for: Jeanette Mccurdy 1945   MRN: [id number]     Reason for Visit:  chief complaint  Start 9a-    Referred by: Enzo  / [facility] [address] / [place]*  Patient Care Team:  Michell Giraldo, JON as PCP - General  [facility] as MD (Podiatry)  Anjali Ny, RN as Specialty Care Coordinator (Cardiology)  Gurmeet Mcknight MD (Cardiovascular Disease)  Gurmeet Mcknight MD as Assigned Heart and Vascular Provider    History of Present Illness:   Jeanette Mccurdy is a 76 year old female with HTN, HCV, GERD, palpitations, euthyroid sick  who is presenting as a new patient in consultation at the request of Dr. Giraldo with a chief complaint of abdominal pain.     ID: [id number]  ---------------------------------------------------------------  Jeanette Mccurdy states she was told that she would be speaking with a gastroenterologist which is why she is here today. When asked further through the  she states sometimes she feels something lingering in her stomach. She has pain in her stomach that causes shivering and tremor. The sensation in her stomach feels like a scrambling. She reports constipation however states she has 1-2 bowel movements a day. She was unable to say the duration this has been going on for. She was unable to say if her symptoms improve with a bowel movement. She denies any prior colonoscopy.   ---------------------------------------------------------------       Wt Readings from Last 5 Encounters:   03/16/21 74.4 kg (164 lb)   03/12/21 76.2 kg (168 lb)   02/19/21 76.2 kg (168 lb)   06/09/17 78.7 kg (173 lb 8 oz)        Esophageal Questionnaire(s)    BEDQ Questionnaire  No flowsheet data found.  No flowsheet data  found.    Eckardt Questionnaire  No flowsheet data found.    Promis 10 Questionnaire  No flowsheet data found.        STUDIES & PROCEDURES:    EGD:   Date: 3/12/21  Impression:  A diverticulum with a small opening was found in the middle third of the        esophagus.        The Z-line was regular.        The gastroesophageal flap valve was visualized endoscopically and        classified as Hill Grade I (prominent fold, tight to endoscope).        Multiple 1 to 2 mm sessile polyps were found in the gastric body. Four        representative polyps were removed with a jumbo cold forceps. Resection        and retrieval were complete.        The exam of the stomach was otherwise normal.        The duodenal bulb and second portion of the duodenum were normal.        Biopsies for histology were taken with a cold forceps for evaluation of        celiac disease.        The exam was otherwise without abnormality.                                                                                     Impression:          Incidentally noted esophageal diverticulum noted in mid                        esophagus. No findings to explain symptoms- biopsied to                        rule out H pylori and celiac disease. Small gastric body                        polyps were also biopsied/resected.                        - Diverticulum in the middle third of the esophagus.                        - Z-line regular.                        - Gastroesophageal flap valve classified as Hill Grade I                        (prominent fold, tight to endoscope).                        - Multiple gastric polyps. Resected and retrieved.                        - Normal duodenal bulb and second portion of the                        duodenum. Biopsied.                        - The examination was otherwise normal.   Pathology Report:  PENDING    Colonoscopy:  Date:  Impression:    Pathology Report:     EndoFLIP directed at the UES or LES (8cm (EF-325)  balloon length or 16cm (EF-322) balloon length):   Date:  8cm balloon  Balloon inflation Balloon pressure CSA (mm^2) DI (mm^2/mmHg) Dmin (mm) Compliance   20 (ladmark ID)        30        40        50           16cm balloon  Balloon inflation Balloon pressure CSA (mm^2) DI (mm^2/mmHg) Dmin (mm) Compliance   30 (ladmark ID)        40        50        60        70           High Resolution Manometry:  Date:  Impression:    PH/Impedance:  Date:  Impression:     Bravo:  48 or 96hr  Date:  Impression:    CT CHEST:  Date: ORDERED  Impression:    Esophagram:  Date: ORDERED  Impression:     Prior medical records were reviewed including, but not limited to, notes from referring providers, lab work, radiographic tests, and other diagnostic tests. Pertinent results were summarized above.     History     Past Medical History:   Diagnosis Date     Hypertension        Past Surgical History:   Procedure Laterality Date     ESOPHAGOSCOPY, GASTROSCOPY, DUODENOSCOPY (EGD), COMBINED N/A 3/12/2021    Procedure: ESOPHAGOGASTRODUODENOSCOPY, WITH BIOPSY;  Surgeon: Tim Minaya MD;  [place] [facility] [place]       Social History     Socioeconomic History     Marital status: Single     Spouse name: Not on file     Number of children: Not on file     Years of education: Not on file     Highest education level: Not on file   Occupational History     Not on file   Social Needs     Financial resource strain: Not on file     Food insecurity     Worry: Not on file     Inability: Not on file     Transportation needs     Medical: Not on file     Non-medical: Not on file   Tobacco Use     Smoking status: Never Smoker     Smokeless tobacco: Never Used   Substance and Sexual Activity     Alcohol use: No     Drug use: No     Sexual activity: Never   Lifestyle     Physical activity     Days per week: Not on file     Minutes per session: Not on file     Stress: Not on file   Relationships     Social connections     Talks on phone: Not on file     Gets  "together: Not on file     Attends Hoahaoism service: Not on file     Active member of club or organization: Not on file     Attends meetings of clubs or organizations: Not on file     Relationship status: Not on file     Intimate partner violence     Fear of current or ex partner: Not on file     Emotionally abused: Not on file     Physically abused: Not on file     Forced sexual activity: Not on file   Other Topics Concern     Not on file   Social History Narrative     Not on file       No family history on file.    Medications and Allergies:     Outpatient Encounter Medications as of 3/16/2021   Medication Sig Dispense Refill     levothyroxine (SYNTHROID/LEVOTHROID) 25 MCG tablet        loratadine (CLARITIN) 10 MG tablet Take 10 mg by mouth       losartan (COZAAR) 25 MG tablet Take 25 mg by mouth daily       omeprazole (PRILOSEC) 40 MG DR capsule        polyethylene glycol (MIRALAX) 17 GM/Dose powder Take 17 g (1 capful) by mouth daily 1530 g 3     celecoxib (CELEBREX) 100 MG capsule Take 1 capsule (100 mg) by mouth 2 times daily 60 capsule 0     dexamethasone (DECADRON) 4 MG/ML injection Use 4 mg or dose determined by provider for iontophoresis. (Patient not taking: Reported on 2/19/2021) 120 mL 0     [DISCONTINUED] polyethylene glycol (MIRALAX) 17 GM/Dose powder Take 17 g (1 capful) by mouth daily 1530 g 3     No facility-administered encounter medications on file as of 3/16/2021.         Allergies   Allergen Reactions     Grapefruit Concentrate Itching     Grapefruit Extract Itching     Fish-Derived Products Itching     Omeprazole GI Disturbance     Peanut-Derived Rash     Bumps on tongue        Review of systems:  A full 10 point review of systems was obtained and was negative except for the pertinent positives and negatives stated within the HPI.    Objective Findings:   Physical Exam:    Constitutional: Ht 1.6 m (5' 3\")   Wt 74.4 kg (164 lb)   BMI 29.05 kg/m    Telephone telemedicine  Labs, Radiology, " Pathology     No results found for: WBC, HGB, PLT, CHOL, TRIG, HDL, ALT, AST, NA, CREATININE, BUN, CO2, TSH, INR, GLUF     Liver Function Studies - No results for input(s): PROTTOTAL, ALBUMIN, BILITOTAL, ALKPHOS, AST, ALT, BILIDIRECT in the last 71701 hours.       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 Giraldo with a chief complaint of abdominal pain.    The patient was seen as a telephone telemedicine visit with a [facility] . The patient encounter was significantly impaired by the patient's inability to characterize her symptoms. Additionally there were some connection difficulties making audio challenging.     Because of the symptoms relayed I have recommended a colonoscopy. Furthermore, the patient has reported possible constipation and I have recommended miralax daily. This may improve her symptoms if they are derived from her constipation.     The pathology is still pending for her upper endoscopy but if H pylori is positive this will need to be treated.     1. Abdominal pain, generalized    2. Constipation, unspecified constipation type    3. Gastroesophageal reflux disease without esophagitis       Orders Placed This Encounter   Procedures     GASTROENTEROLOGY ADULT REF PROCEDURE ONLY      Plan:  1. Await pathology reports from upper endoscopy performed by   Rei  2. Schedule colonoscopy to evaluate symptoms of pain and constipation. Additionally you have never undergone colonoscopy and this should be done for colorectal cancer screening purposes.   3. Begin miralax daily for constipation.     Follow up plan:   Return to clinic 6 months and as needed.    The risks and benefits of my recommendations, as well as other treatment options were discussed with the patient and any available family today. All questions were answered.     o Follow up: As planned above. Today, I personally spent 27 minutes in direct telephone time with the patient, of which greater than 50% of the time was spent in patient education and counseling as described above. Approximately 15 minutes were spent on indirect care associated with the patient's consultation including but not limited to review of: patient medical records to date, clinic visits, hospital records, lab results, imaging studies, procedural documentation, and coordinating care with other providers. The findings from this review are summarized in the above note.    The patient verbalized understanding of the plan and was appreciative for the time spent and information provided during the office visit.     Author:   Tony Terry DO   of Medicine  Division of Gastroenterology, Hepatology, and Nutrition  [facility]     Documentation assisted by voice recognition and documentation system.

## 2021-03-18 LAB — COPATH REPORT: NORMAL

## 2021-03-30 ENCOUNTER — ANCILLARY PROCEDURE (OUTPATIENT)
Dept: CT IMAGING | Facility: CLINIC | Age: 76
End: 2021-03-30
Attending: INTERNAL MEDICINE
Payer: COMMERCIAL

## 2021-03-30 ENCOUNTER — ANCILLARY PROCEDURE (OUTPATIENT)
Dept: GENERAL RADIOLOGY | Facility: CLINIC | Age: 76
End: 2021-03-30
Attending: INTERNAL MEDICINE
Payer: COMMERCIAL

## 2021-03-30 DIAGNOSIS — Q39.6: ICD-10-CM

## 2021-03-30 LAB
CREAT BLD-MCNC: 0.8 MG/DL (ref 0.52–1.04)
GFR SERPL CREATININE-BSD FRML MDRD: 70 ML/MIN/{1.73_M2}

## 2021-03-30 PROCEDURE — 71260 CT THORAX DX C+: CPT | Performed by: RADIOLOGY

## 2021-03-30 PROCEDURE — 74220 X-RAY XM ESOPHAGUS 1CNTRST: CPT | Mod: GC | Performed by: RADIOLOGY

## 2021-03-30 PROCEDURE — 82565 ASSAY OF CREATININE: CPT | Performed by: PATHOLOGY

## 2021-03-30 PROCEDURE — 36415 COLL VENOUS BLD VENIPUNCTURE: CPT | Performed by: PATHOLOGY

## 2021-03-30 RX ORDER — IOPAMIDOL 755 MG/ML
60 INJECTION, SOLUTION INTRAVASCULAR ONCE
Status: COMPLETED | OUTPATIENT
Start: 2021-03-30 | End: 2021-03-30

## 2021-03-30 RX ADMIN — IOPAMIDOL 60 ML: 755 INJECTION, SOLUTION INTRAVASCULAR at 08:57

## 2021-04-12 ENCOUNTER — TELEPHONE (OUTPATIENT)
Dept: GASTROENTEROLOGY | Facility: CLINIC | Age: 76
End: 2021-04-12

## 2021-04-12 NOTE — TELEPHONE ENCOUNTER
Patient is scheduled for colonoscopy  with Dr. Minaya    Spoke with: Jeanette Mccurdy    Date of Procedure: 4/29/2021     Location: [facility]    Sedation Type C/S    Conscious Sedation- Needs  for 6 hours after the procedure  MAC/General-Needs  for 24 hours after procedure    Pre-op for Unit [facility] MAC and OR not needed    (if yes advise patient they will need a pre-op prior to procedure)      Is patient on blood thinners? -no (If yes- inform patient to follow up with PCP or provider for follow up instructions)     Informed patient they will need an adult  yes  Cannot take any type of public or medical transportation alone    Informed Patient of COVID Test Requirement yes and fax number given    Preferred Pharmacy for Pre Prescription on chart    Confirmed Nurse will call to complete assessment yes    Additional comments: no

## 2021-04-18 DIAGNOSIS — Z11.59 ENCOUNTER FOR SCREENING FOR OTHER VIRAL DISEASES: ICD-10-CM

## 2021-04-22 ENCOUNTER — APPOINTMENT (OUTPATIENT)
Dept: INTERPRETER SERVICES | Facility: CLINIC | Age: 76
End: 2021-04-22
Payer: COMMERCIAL

## 2021-04-26 ENCOUNTER — TRANSFERRED RECORDS (OUTPATIENT)
Dept: HEALTH INFORMATION MANAGEMENT | Facility: CLINIC | Age: 76
End: 2021-04-26

## 2021-04-26 ENCOUNTER — TELEPHONE (OUTPATIENT)
Dept: GASTROENTEROLOGY | Facility: CLINIC | Age: 76
End: 2021-04-26

## 2021-04-26 NOTE — TELEPHONE ENCOUNTER
Attempted to contact patient with  services regarding upcoming colonoscopy procedure on 4.29.2021 for pre assessment questions.  No answer.  Left message to return call to [phone] #[phone]    COVID test scheduled?  Is pt taking miralax daily? Is miralax/mag cit prep appropriate or should pt take golytely?    Annalee Mora RN

## 2021-04-28 ENCOUNTER — APPOINTMENT (OUTPATIENT)
Dept: INTERPRETER SERVICES | Facility: CLINIC | Age: 76
End: 2021-04-28
Payer: COMMERCIAL

## 2021-04-28 NOTE — TELEPHONE ENCOUNTER
"Writer reviewed pre-assessment questions with patient prior to upcoming colonoscopy on 4.29.2021.  COVID test done 4.27.2021 at [facility] per pt.  COVID test is negative.  Designated  policy reviewed.     Reviewed miralax/magnesium citrate prep instructions with patient for the day prior to exam and day of exam.  Pt was only ordered 2 tabs of ducolax.  Pt will take at 4pm.  Pt will not take ducolax tabs at 11am.     Pt did eat a \"white rice oatmeal\" at 8am for breakfast.  Pt was instructed NO solid food moving forward.  Pt needs to be on a clear liquid diet until procedure.     Patient verbalized understanding.  No further questions or concerns.    Annalee Mora RN      "

## 2021-04-29 ENCOUNTER — HOSPITAL ENCOUNTER (OUTPATIENT)
Facility: CLINIC | Age: 76
Discharge: HOME | End: 2021-04-29
Attending: INTERNAL MEDICINE | Admitting: INTERNAL MEDICINE
Payer: COMMERCIAL

## 2021-04-29 VITALS
OXYGEN SATURATION: 98 % | DIASTOLIC BLOOD PRESSURE: 68 MMHG | SYSTOLIC BLOOD PRESSURE: 132 MMHG | TEMPERATURE: 97.7 F | BODY MASS INDEX: 27.78 KG/M2 | RESPIRATION RATE: 16 BRPM | HEIGHT: 64 IN | HEART RATE: 68 BPM | WEIGHT: 162.7 LBS

## 2021-04-29 LAB — COLONOSCOPY: NORMAL

## 2021-04-29 PROCEDURE — G0500 MOD SEDAT ENDO SERVICE >5YRS: HCPCS | Performed by: INTERNAL MEDICINE

## 2021-04-29 PROCEDURE — 250N000011 HC RX IP 250 OP 636: Performed by: INTERNAL MEDICINE

## 2021-04-29 PROCEDURE — 45378 DIAGNOSTIC COLONOSCOPY: CPT | Performed by: INTERNAL MEDICINE

## 2021-04-29 RX ORDER — FENTANYL CITRATE 50 UG/ML
INJECTION, SOLUTION INTRAMUSCULAR; INTRAVENOUS PRN
Status: COMPLETED | OUTPATIENT
Start: 2021-04-29 | End: 2021-04-29

## 2021-04-29 RX ORDER — NALOXONE HYDROCHLORIDE 0.4 MG/ML
0.2 INJECTION, SOLUTION INTRAMUSCULAR; INTRAVENOUS; SUBCUTANEOUS
Status: DISCONTINUED | OUTPATIENT
Start: 2021-04-29 | End: 2021-04-29 | Stop reason: HOSPADM

## 2021-04-29 RX ORDER — FLUMAZENIL 0.1 MG/ML
0.2 INJECTION, SOLUTION INTRAVENOUS
Status: DISCONTINUED | OUTPATIENT
Start: 2021-04-29 | End: 2021-04-29 | Stop reason: HOSPADM

## 2021-04-29 RX ORDER — ONDANSETRON 4 MG/1
4 TABLET, ORALLY DISINTEGRATING ORAL EVERY 6 HOURS PRN
Status: DISCONTINUED | OUTPATIENT
Start: 2021-04-29 | End: 2021-04-29 | Stop reason: HOSPADM

## 2021-04-29 RX ORDER — NALOXONE HYDROCHLORIDE 0.4 MG/ML
0.4 INJECTION, SOLUTION INTRAMUSCULAR; INTRAVENOUS; SUBCUTANEOUS
Status: DISCONTINUED | OUTPATIENT
Start: 2021-04-29 | End: 2021-04-29 | Stop reason: HOSPADM

## 2021-04-29 RX ORDER — ONDANSETRON 2 MG/ML
4 INJECTION INTRAMUSCULAR; INTRAVENOUS
Status: DISCONTINUED | OUTPATIENT
Start: 2021-04-29 | End: 2021-04-29 | Stop reason: HOSPADM

## 2021-04-29 RX ORDER — ONDANSETRON 2 MG/ML
4 INJECTION INTRAMUSCULAR; INTRAVENOUS EVERY 6 HOURS PRN
Status: DISCONTINUED | OUTPATIENT
Start: 2021-04-29 | End: 2021-04-29 | Stop reason: HOSPADM

## 2021-04-29 RX ORDER — PROCHLORPERAZINE MALEATE 5 MG
5 TABLET ORAL EVERY 6 HOURS PRN
Status: DISCONTINUED | OUTPATIENT
Start: 2021-04-29 | End: 2021-04-29 | Stop reason: HOSPADM

## 2021-04-29 RX ORDER — LIDOCAINE 40 MG/G
CREAM TOPICAL
Status: DISCONTINUED | OUTPATIENT
Start: 2021-04-29 | End: 2021-04-29 | Stop reason: HOSPADM

## 2021-04-29 RX ADMIN — FENTANYL CITRATE 50 MCG: 50 INJECTION, SOLUTION INTRAMUSCULAR; INTRAVENOUS at 10:59

## 2021-04-29 RX ADMIN — MIDAZOLAM 2 MG: 1 INJECTION INTRAMUSCULAR; INTRAVENOUS at 10:59

## 2021-04-29 ASSESSMENT — MIFFLIN-ST. JEOR: SCORE: 1208

## 2021-06-03 ENCOUNTER — MEDICAL CORRESPONDENCE (OUTPATIENT)
Dept: HEALTH INFORMATION MANAGEMENT | Facility: CLINIC | Age: 76
End: 2021-06-03

## 2021-06-03 ENCOUNTER — TRANSFERRED RECORDS (OUTPATIENT)
Dept: HEALTH INFORMATION MANAGEMENT | Facility: CLINIC | Age: 76
End: 2021-06-03

## 2021-06-04 ENCOUNTER — TRANSCRIBE ORDERS (OUTPATIENT)
Dept: OTHER | Age: 76
End: 2021-06-04

## 2021-06-04 DIAGNOSIS — M79.672 LEFT FOOT PAIN: Primary | ICD-10-CM

## 2021-06-30 ENCOUNTER — OFFICE VISIT (OUTPATIENT)
Dept: ORTHOPEDICS | Facility: CLINIC | Age: 76
End: 2021-06-30
Payer: COMMERCIAL

## 2021-06-30 ENCOUNTER — ANCILLARY PROCEDURE (OUTPATIENT)
Dept: GENERAL RADIOLOGY | Facility: CLINIC | Age: 76
End: 2021-06-30
Attending: PODIATRIST
Payer: COMMERCIAL

## 2021-06-30 ENCOUNTER — APPOINTMENT (OUTPATIENT)
Dept: INTERPRETER SERVICES | Facility: CLINIC | Age: 76
End: 2021-06-30
Payer: COMMERCIAL

## 2021-06-30 DIAGNOSIS — M72.2 PLANTAR FASCIITIS: ICD-10-CM

## 2021-06-30 DIAGNOSIS — M79.672 LEFT FOOT PAIN: ICD-10-CM

## 2021-06-30 DIAGNOSIS — M79.672 PAIN OF LEFT HEEL: ICD-10-CM

## 2021-06-30 DIAGNOSIS — M79.672 PAIN OF LEFT HEEL: Primary | ICD-10-CM

## 2021-06-30 PROCEDURE — 20550 NJX 1 TENDON SHEATH/LIGAMENT: CPT | Performed by: PODIATRIST

## 2021-06-30 PROCEDURE — 99417 PROLNG OP E/M EACH 15 MIN: CPT | Performed by: PODIATRIST

## 2021-06-30 PROCEDURE — 99215 OFFICE O/P EST HI 40 MIN: CPT | Mod: 25 | Performed by: PODIATRIST

## 2021-06-30 PROCEDURE — 73650 X-RAY EXAM OF HEEL: CPT | Mod: LT | Performed by: RADIOLOGY

## 2021-06-30 RX ADMIN — LIDOCAINE HYDROCHLORIDE 1 ML: 10 INJECTION, SOLUTION INFILTRATION; PERINEURAL at 11:14

## 2021-06-30 RX ADMIN — TRIAMCINOLONE ACETONIDE 40 MG: 40 INJECTION, SUSPENSION INTRA-ARTICULAR; INTRAMUSCULAR at 11:14

## 2021-06-30 RX ADMIN — DEXAMETHASONE SODIUM PHOSPHATE 4 MG: 4 INJECTION, SOLUTION INTRA-ARTICULAR; INTRALESIONAL; INTRAMUSCULAR; INTRAVENOUS; SOFT TISSUE at 11:14

## 2021-06-30 NOTE — NURSING NOTE
Reason For Visit:   Chief Complaint   Patient presents with     RECHECK     Left foot paininterested in steroid injection /Michell Giraldo DNP @ [facility]       There were no vitals taken for this visit.    Pain Assessment  Patient Currently in Pain: Yes  0-10 Pain Scale: 9  Primary Pain Location: Foot    Natasha IMELDA Gracia

## 2021-06-30 NOTE — LETTER
6/30/2021     RE: Jeanette Mccurdy  [address]  [address]  [place] [address]    Dear Colleague,    Thank you for referring your patient, Jeanette Mccurdy, to the [facility] [place]. Please see a copy of my visit note below.    Chief Complaint:   Chief Complaint   Patient presents with     RECHECK     Left foot paininterested in steroid injection /Galarza Rice DNP @ [facility]          Allergies   Allergen Reactions     Grapefruit Concentrate Itching     Grapefruit Extract Itching     Fish-Derived Products Itching     Lisinopril Other (See Comments)     LegacyRecord#[id number] says she coughs when she takes     Omeprazole GI Disturbance     Peanut-Derived Rash     Bumps on tongue         Subjective: Jeanette is a 76 year old female who presents to the clinic today for a follow up of left foot pain.   Nature: Sharp/dull/aching    Location: Left plantar heel    Duration: Few months    Onset: Gradual.  No inciting trauma.    Course: Worsening    Aggravating/alleviating factors: Positive post static dyskinesia.  Walking and weightbearing aggravate.  Rest alleviates.    Previous Treatments: Self massage.      Objective  Data Unavailable Data Unavailable Data Unavailable Data Unavailable Data Unavailable 0 lbs 0 oz  DP and PT pulses are 2/4.  Capillary refill time is instant.  Positive pedal hair.  Gross sensation is intact.  Pain is noted on the left heel at the medial attachment of the plantar fascia on the calcaneus.  Some pain noted with lateral calcaneal squeeze.  Some pain noted along the course of the PT, peroneal, and Achilles tendons on the left.  No pain noted in the ankle joint with range of motion or palpation.    left calcaneal xrays indicated in 2 weightbearing views.    No fractures. Normal alignment. No soft tissue abnormality.  Retrocalcaneal and plantar calcaneal spur noted.      Assessment: Jeanette is a 76-year-old with left plantar fasciitis.  She did have good results from having the  injection on the right side.  She would like this performed again today.  Today we did use a [employer]am[employer]  via iPad.    Plan:   - Pt seen and evaluated.  -X-rays taken and discussed with her.  -I discussed the treatment options with her.  She would like to do some physical therapy as well as have an injection.  I did write an order for the physical therapy.  -I discussed the risks, alternatives, complications, benefits, and answered all of her questions of having a left heel injection.  Consent was signed.  After skin prep, an injection consisting of 3 cc of one-to-one mix of 1% lidocaine plain plus dexamethasone 4 mg plus Kenalog 40 was injected into the left heel.  She tolerated this well with no complications.  - Pt to return to clinic as needed.  I spent 75 minutes today with patient care, documentation, image review, and treatment through an .    Small Joint Injection/Arthrocentesis    Date/Time: 6/30/2021 11:14 AM  Performed by: Patel Taylor DPM  Authorized by: Patel Taylor DPM              Location comment:  Left Heel injection                      Medications:  1 mL lidocaine 1 %; 40 mg triamcinolone 40 MG/ML; 4 mg dexamethasone 4 MG/ML        Consent Given by:  Patient    Again, thank you for allowing me to participate in the care of your patient.      Sincerely,      Patel Taylor DPM

## 2021-06-30 NOTE — PROGRESS NOTES
Chief Complaint:   Chief Complaint   Patient presents with     RECHECK     Left foot paininterested in steroid injection /Galarza Rice DNP @ [facility]          Allergies   Allergen Reactions     Grapefruit Concentrate Itching     Grapefruit Extract Itching     Fish-Derived Products Itching     Lisinopril Other (See Comments)     LegacyRecord#[id number] says she coughs when she takes     Omeprazole GI Disturbance     Peanut-Derived Rash     Bumps on tongue         Subjective: Jeanette is a 76 year old female who presents to the clinic today for a follow up of left foot pain.   Nature: Sharp/dull/aching    Location: Left plantar heel    Duration: Few months    Onset: Gradual.  No inciting trauma.    Course: Worsening    Aggravating/alleviating factors: Positive post static dyskinesia.  Walking and weightbearing aggravate.  Rest alleviates.    Previous Treatments: Self massage.      Objective  Data Unavailable Data Unavailable Data Unavailable Data Unavailable Data Unavailable 0 lbs 0 oz  DP and PT pulses are 2/4.  Capillary refill time is instant.  Positive pedal hair.  Gross sensation is intact.  Pain is noted on the left heel at the medial attachment of the plantar fascia on the calcaneus.  Some pain noted with lateral calcaneal squeeze.  Some pain noted along the course of the PT, peroneal, and Achilles tendons on the left.  No pain noted in the ankle joint with range of motion or palpation.    left calcaneal xrays indicated in 2 weightbearing views.    No fractures. Normal alignment. No soft tissue abnormality.  Retrocalcaneal and plantar calcaneal spur noted.      Assessment: Jeanette is a 76-year-old with left plantar fasciitis.  She did have good results from having the injection on the right side.  She would like this performed again today.  Today we did use a [employer]  via iPad.    Plan:   - Pt seen and evaluated.  -X-rays taken and discussed with her.  -I discussed the treatment options with her.  She would like to  do some physical therapy as well as have an injection.  I did write an order for the physical therapy.  -I discussed the risks, alternatives, complications, benefits, and answered all of her questions of having a left heel injection.  Consent was signed.  After skin prep, an injection consisting of 3 cc of one-to-one mix of 1% lidocaine plain plus dexamethasone 4 mg plus Kenalog 40 was injected into the left heel.  She tolerated this well with no complications.  - Pt to return to clinic as needed.  I spent 75 minutes today with patient care, documentation, image review, and treatment through an .

## 2021-06-30 NOTE — PROGRESS NOTES
Small Joint Injection/Arthrocentesis    Date/Time: 6/30/2021 11:14 AM  Performed by: Patel Taylor DPM  Authorized by: Patel Taylor DPM              Location comment:  Left Heel injection                      Medications:  1 mL lidocaine 1 %; 40 mg triamcinolone 40 MG/ML; 4 mg dexamethasone 4 MG/ML            Consent Given by:  Patient

## 2021-07-01 ENCOUNTER — APPOINTMENT (OUTPATIENT)
Dept: INTERPRETER SERVICES | Facility: CLINIC | Age: 76
End: 2021-07-01
Payer: COMMERCIAL

## 2021-07-12 ENCOUNTER — TELEPHONE (OUTPATIENT)
Dept: ORTHOPEDICS | Facility: CLINIC | Age: 76
End: 2021-07-12

## 2021-07-12 NOTE — TELEPHONE ENCOUNTER
RN called using  service and notify her that the PT order is faxed per her request. Patient expressed understanding.    Moshe Das RN        [facility]    Phone Message    May a detailed message be left on voicemail: yes     Reason for Call: Form or Letter   Type or form/letter needing completion: PT orders  Provider: Dr. Taylor  Date form needed: today  Once completed: Fax form to: [facility] @ [phone]     Phone # for [place] is [phone].    Please call patient when order has been sent.    Action Taken: Message routed to:  [facility] sports    Travel Screening: Not Applicable

## 2021-07-27 RX ORDER — LIDOCAINE HYDROCHLORIDE 10 MG/ML
1 INJECTION, SOLUTION INFILTRATION; PERINEURAL
Status: SHIPPED | OUTPATIENT
Start: 2021-06-30

## 2021-07-27 RX ORDER — TRIAMCINOLONE ACETONIDE 40 MG/ML
40 INJECTION, SUSPENSION INTRA-ARTICULAR; INTRAMUSCULAR
Status: SHIPPED | OUTPATIENT
Start: 2021-06-30

## 2021-07-27 RX ORDER — DEXAMETHASONE SODIUM PHOSPHATE 4 MG/ML
4 INJECTION, SOLUTION INTRA-ARTICULAR; INTRALESIONAL; INTRAMUSCULAR; INTRAVENOUS; SOFT TISSUE
Status: SHIPPED | OUTPATIENT
Start: 2021-06-30

## 2021-08-16 ENCOUNTER — LAB REQUISITION (OUTPATIENT)
Dept: LAB | Facility: CLINIC | Age: 76
End: 2021-08-16
Payer: COMMERCIAL

## 2021-08-16 DIAGNOSIS — N95.0 POSTMENOPAUSAL BLEEDING: ICD-10-CM

## 2021-08-16 PROCEDURE — 88175 CYTOPATH C/V AUTO FLUID REDO: CPT | Mod: ORL | Performed by: NURSE PRACTITIONER

## 2021-08-16 PROCEDURE — 87086 URINE CULTURE/COLONY COUNT: CPT | Mod: ORL | Performed by: NURSE PRACTITIONER

## 2021-08-16 PROCEDURE — 87624 HPV HI-RISK TYP POOLED RSLT: CPT | Mod: ORL | Performed by: NURSE PRACTITIONER

## 2021-08-17 LAB — BACTERIA UR CULT: NORMAL

## 2021-08-18 LAB
BKR LAB AP GYN ADEQUACY: NORMAL
BKR LAB AP GYN INTERPRETATION: NORMAL
BKR LAB AP HPV REFLEX: NORMAL
BKR LAB AP PREVIOUS ABNORMAL: NORMAL
PATH REPORT.COMMENTS IMP SPEC: NORMAL

## 2021-08-20 LAB
HUMAN PAPILLOMA VIRUS 16 DNA: NEGATIVE
HUMAN PAPILLOMA VIRUS 18 DNA: NEGATIVE
HUMAN PAPILLOMA VIRUS FINAL DIAGNOSIS: NORMAL
HUMAN PAPILLOMA VIRUS OTHER HR: NEGATIVE

## 2021-08-26 ENCOUNTER — ANCILLARY PROCEDURE (OUTPATIENT)
Dept: ULTRASOUND IMAGING | Facility: CLINIC | Age: 76
End: 2021-08-26
Attending: NURSE PRACTITIONER
Payer: COMMERCIAL

## 2021-08-26 DIAGNOSIS — N95.0 POST-MENOPAUSAL BLEEDING: ICD-10-CM

## 2021-08-26 PROCEDURE — 76856 US EXAM PELVIC COMPLETE: CPT | Mod: GC | Performed by: RADIOLOGY

## 2021-08-26 PROCEDURE — 76830 TRANSVAGINAL US NON-OB: CPT | Mod: GC | Performed by: RADIOLOGY

## 2021-09-20 ENCOUNTER — LAB REQUISITION (OUTPATIENT)
Dept: LAB | Facility: CLINIC | Age: 76
End: 2021-09-20
Payer: COMMERCIAL

## 2021-09-20 DIAGNOSIS — N95.0 POSTMENOPAUSAL BLEEDING: ICD-10-CM

## 2021-09-20 PROCEDURE — 88305 TISSUE EXAM BY PATHOLOGIST: CPT | Mod: TC,ORL | Performed by: FAMILY MEDICINE

## 2021-09-22 LAB
PATH REPORT.COMMENTS IMP SPEC: NORMAL
PATH REPORT.COMMENTS IMP SPEC: NORMAL
PATH REPORT.FINAL DX SPEC: NORMAL
PATH REPORT.GROSS SPEC: NORMAL
PATH REPORT.MICROSCOPIC SPEC OTHER STN: NORMAL
PATH REPORT.RELEVANT HX SPEC: NORMAL
PHOTO IMAGE: NORMAL

## 2021-09-22 PROCEDURE — 88305 TISSUE EXAM BY PATHOLOGIST: CPT | Mod: 26 | Performed by: PATHOLOGY

## 2021-11-09 ENCOUNTER — OFFICE VISIT (OUTPATIENT)
Dept: ORTHOPEDICS | Facility: CLINIC | Age: 76
End: 2021-11-09
Payer: COMMERCIAL

## 2021-11-09 DIAGNOSIS — M72.2 PLANTAR FASCIITIS: ICD-10-CM

## 2021-11-09 DIAGNOSIS — M79.672 LEFT FOOT PAIN: Primary | ICD-10-CM

## 2021-11-09 DIAGNOSIS — M79.672 PAIN OF LEFT HEEL: ICD-10-CM

## 2021-11-09 PROCEDURE — 99215 OFFICE O/P EST HI 40 MIN: CPT | Performed by: PODIATRIST

## 2021-11-09 NOTE — NURSING NOTE
Reason For Visit:   Chief Complaint   Patient presents with     Follow Up     Pain, left foot.        Pain Assessment  Patient Currently in Pain: Yes  Primary Pain Location: Foot (Left)        Allergies   Allergen Reactions     Grapefruit Concentrate Itching     Grapefruit Extract Itching     Fish-Derived Products Itching     Lisinopril Other (See Comments)     LegacyRecord#[id number] says she coughs when she takes     Omeprazole GI Disturbance     Peanut-Derived Rash     Bumps on tongue           Dali IMELDA Tam

## 2021-11-09 NOTE — PROGRESS NOTES
Chief Complaint:   Chief Complaint   Patient presents with     Follow Up     Pain, left foot.           Allergies   Allergen Reactions     Grapefruit Concentrate Itching     Grapefruit Extract Itching     Fish-Derived Products Itching     Lisinopril Other (See Comments)     LegacyRecord#[id number] says she coughs when she takes     Omeprazole GI Disturbance     Peanut-Derived Rash     Bumps on tongue         Subjective: Jeanette is a 76 year old female who presents to the clinic today for a follow up of left foot pain.   She relates that all she needs today is a referral for PT again.  I have given this referral for a number of times, and she would like to start this.  Pearescope  was used via phone.      Objective  Data Unavailable Data Unavailable Data Unavailable Data Unavailable Data Unavailable 0 lbs 0 oz  DP and PT pulses are 2/4.  Capillary refill time is instant.  Positive pedal hair.  Gross sensation is intact.  Pain is noted on the left heel at the medial attachment of the plantar fascia on the calcaneus.  Some pain noted with lateral calcaneal squeeze.  Some pain noted along the course of the PT, peroneal, and Achilles tendons on the left.  No pain noted in the ankle joint with range of motion or palpation.        Assessment: Jeanette is a 76-year-old with left plantar fasciitis.  She would like to get the information again for physical therapy.  I did give her this to start this.  I tried to get her scheduled for this before she left the clinic, however our clinic coordinators were not available to do this.    Plan:   - Pt seen and evaluated.  -Physical therapy referral was given to her again.  -See again as needed.  I spent 40 minutes today with patient care, documentation, chart review, and care coordination.

## 2021-11-09 NOTE — LETTER
11/9/2021         RE: Jeanette Mccurdy  [address]  [address]  [facility] [address]        Dear Colleague,    Thank you for referring your patient, Jeanette Mccurdy, to the [facility] [place]. Please see a copy of my visit note below.    Chief Complaint:   Chief Complaint   Patient presents with     Follow Up     Pain, left foot.           Allergies   Allergen Reactions     Grapefruit Concentrate Itching     Grapefruit Extract Itching     Fish-Derived Products Itching     Lisinopril Other (See Comments)     [employer]#[id number] says she coughs when she takes     Omeprazole GI Disturbance     Peanut-Derived Rash     Bumps on tongue         Subjective: Jeanette is a 76 year old female who presents to the clinic today for a follow up of left foot pain.   She relates that all she needs today is a referral for PT again.  I have given this referral for a number of times, and she would like to start this.  [employer]  was used via phone.      Objective  Data Unavailable Data Unavailable Data Unavailable Data Unavailable Data Unavailable 0 lbs 0 oz  DP and PT pulses are 2/4.  Capillary refill time is instant.  Positive pedal hair.  Gross sensation is intact.  Pain is noted on the left heel at the medial attachment of the plantar fascia on the calcaneus.  Some pain noted with lateral calcaneal squeeze.  Some pain noted along the course of the PT, peroneal, and Achilles tendons on the left.  No pain noted in the ankle joint with range of motion or palpation.        Assessment: Jeanette is a 76-year-old with left plantar fasciitis.  She would like to get the information again for physical therapy.  I did give her this to start this.  I tried to get her scheduled for this before she left the clinic, however our clinic coordinators were not available to do this.    Plan:   - Pt seen and evaluated.  -Physical therapy referral was given to her again.  -See again as needed.  I spent 40 minutes  today with patient care, documentation, chart review, and care coordination.    Again, thank you for allowing me to participate in the care of your patient.        Sincerely,        Patel Taylor DPM

## 2021-12-10 ENCOUNTER — THERAPY VISIT (OUTPATIENT)
Dept: PHYSICAL THERAPY | Facility: CLINIC | Age: 76
End: 2021-12-10
Attending: PODIATRIST
Payer: COMMERCIAL

## 2021-12-10 DIAGNOSIS — M79.672 LEFT FOOT PAIN: ICD-10-CM

## 2021-12-10 DIAGNOSIS — M79.672 PAIN OF LEFT HEEL: ICD-10-CM

## 2021-12-10 DIAGNOSIS — M72.2 PLANTAR FASCIITIS: ICD-10-CM

## 2021-12-10 PROCEDURE — 97140 MANUAL THERAPY 1/> REGIONS: CPT | Mod: GP | Performed by: PHYSICAL THERAPIST

## 2021-12-10 PROCEDURE — 97161 PT EVAL LOW COMPLEX 20 MIN: CPT | Mod: GP | Performed by: PHYSICAL THERAPIST

## 2021-12-10 PROCEDURE — 97110 THERAPEUTIC EXERCISES: CPT | Mod: GP | Performed by: PHYSICAL THERAPIST

## 2021-12-12 NOTE — PROGRESS NOTES
[facility]    OUTPATIENT Physical Therapy ORTHOPEDIC EVALUATION  PLAN OF TREATMENT FOR OUTPATIENT REHABILITATION  (COMPLETE FOR INITIAL CLAIMS ONLY)  Patient's Last Name, First Name, M.I.  YOB: 1945  Jeanette Mccurdy       Provider s Name:  [facility]   Medical Record No.  [id number]   Start of Care Date:  12/10/21   Onset Date:   11/09/21   Type:     _X__PT   ___OT Medical Diagnosis:    Encounter Diagnoses   Name Primary?     Left foot pain      Pain of left heel      Plantar fasciitis         Treatment Diagnosis:  L plantar fasciitis        Goals:     12/10/21 0559   Body Part   Goals listed below are for ankle   Goal #1   Goal #1 ambulation   Previous Functional Level No restrictions   Performance Level with SEC, 10/10 pain   Performance Level with SEC, 6/10 or less PL   Rationale to maintain proper body mechanics/posture while ambulating to avoid additional compensatory injury due to improper gait mechanics   Due Date 01/02/22   Performance Level no AD 2/10 or less PL   Rationale to maintain proper body mechanics/posture while ambulating to avoid additional compensatory injury due to improper gait mechanics   Due Date 02/10/22       Therapy Frequency:  1x/week  Predicted Duration of Therapy Intervention:  6 weeks    Ewa Graves, PT                 I CERTIFY THE NEED FOR THESE SERVICES FURNISHED UNDER        THIS PLAN OF TREATMENT AND WHILE UNDER MY CARE     (Physician co-signature of this document indicates review and certification of the therapy plan).                       Certification Date From:  12/10/21   Certification Date To:  03/09/22    Referring Provider:  Patel Taylor    Initial Assessment        See Epic Evaluation SOC Date: 12/10/21

## 2021-12-12 NOTE — PROGRESS NOTES
Physical Therapy Initial Evaluation  Subjective:  [facility]  Physical Therapy Initial Examination/Evaluation  December 10, 2021    Jeanette Mccurdy is a 76 year old  female referred to physical therapy by Dr. Mensah for treatment of plantar fasciitis with Precautions/Restrictions/MD instructions none    KEY PT FINDINGS:  1.  TTP origin of plantar fascia  2.  Tightness of G-S complex  3.  Ambulates with SEC    Subjective:  Referring MD visit date: 11/9/21  DOI/onset: seven months ago  Mechanism of injury: insidious onset  DOS none  Previous treatment: assistive device  Imaging: none  Chief Complaint:   Pain with ambulation   Pain: best 10 /10, worst 10/10 heel to hip Described as: aching Alleviated by: none Frequency: constant Progression of symptoms since initial onset: staying the same Time of day when pain is worse: not related  Sleeping: not affected  Social history:  From [facility]    Occupation: none  Job duties:  none    Current HEP/exercise regimen: used to love walking  Patient's goals are reduce pain    Pertinent PMH: see epic   General Health Reported by Patient: good  Return to MD:  PRN     Physical Examination    Gait:  Independent with SEC    ROM:  Ankle   DF 10 deg bilaterally  PF 60 deg bilaterally  INV 45 deg bilaterally  RUTH 30 deg bilaterally    MMT:  Ankle:  DF 5/5 B  PF 5/5 B  INV 5/5 B  Ruth 5-/5 B  Hip:  Gluteus medius 4/5 B    Palpation:  TTP origin of plantar fascia on L      Assessment/Plan      Patient is a 76 year old female with left side ankle complaints.    Patient has the following significant findings with corresponding treatment plan.                Diagnosis 1:  Plantar fasciitis    Pain -  hot/cold therapy, US, electric stimulation, manual therapy, splint/taping/bracing/orthotics, self management, education, and home program  Decreased ROM/flexibility - manual therapy and therapeutic exercise  Decreased joint mobility - manual therapy and  therapeutic exercise  Decreased strength - therapeutic exercise and therapeutic activities  Impaired balance - neuro re-education and therapeutic activities  Decreased proprioception - neuro re-education and therapeutic activities  Inflammation - cold therapy  Edema - vasopneumatics  Impaired gait - gait training  Impaired muscle performance - neuro re-education  Decreased function - therapeutic activities    Therapy Evaluation Codes:   1) History comprised of:   Personal factors that impact the plan of care:      None.    Comorbidity factors that impact the plan of care are:      None.     Medications impacting care: None.  2) Examination of Body Systems comprised of:   Body structures and functions that impact the plan of care:      Ankle.   Activity limitations that impact the plan of care are:      Standing and Walking.  3) Clinical presentation characteristics are:   Stable/Uncomplicated.  4) Decision-Making    Low complexity using standardized patient assessment instrument and/or measureable assessment of functional outcome.  Cumulative Therapy Evaluation is: Low complexity.    Previous and current functional limitations:  (See Goal Flow Sheet for this information)    Short term and Long term goals: (See Goal Flow Sheet for this information)     Communication ability:  Patient appears to be able to clearly communicate and understand verbal and written communication and follow directions correctly.  Treatment Explanation - The following has been discussed with the patient:   RX ordered/plan of care  Anticipated outcomes  Possible risks and side effects  This patient would benefit from PT intervention to resume normal activities.   Rehab potential is good.    Frequency:  1 X week, once daily  Duration:  for 6 weeks  Discharge Plan:  Achieve all LTG.  Independent in home treatment program.  Reach maximal therapeutic benefit.    Please refer to the daily flowsheet for treatment today, total treatment time and time  spent performing 1:1 timed codes.

## 2021-12-17 ENCOUNTER — THERAPY VISIT (OUTPATIENT)
Dept: PHYSICAL THERAPY | Facility: CLINIC | Age: 76
End: 2021-12-17
Attending: PODIATRIST
Payer: COMMERCIAL

## 2021-12-17 DIAGNOSIS — M79.672 LEFT FOOT PAIN: Primary | ICD-10-CM

## 2021-12-17 PROCEDURE — 97110 THERAPEUTIC EXERCISES: CPT | Mod: GP | Performed by: PHYSICAL THERAPIST

## 2021-12-17 PROCEDURE — 97140 MANUAL THERAPY 1/> REGIONS: CPT | Mod: GP | Performed by: PHYSICAL THERAPIST

## 2021-12-17 PROCEDURE — 97530 THERAPEUTIC ACTIVITIES: CPT | Mod: GP | Performed by: PHYSICAL THERAPIST

## 2022-01-06 ENCOUNTER — THERAPY VISIT (OUTPATIENT)
Dept: PHYSICAL THERAPY | Facility: CLINIC | Age: 77
End: 2022-01-06
Payer: COMMERCIAL

## 2022-01-06 DIAGNOSIS — M79.672 LEFT FOOT PAIN: Primary | ICD-10-CM

## 2022-01-06 PROCEDURE — 97110 THERAPEUTIC EXERCISES: CPT | Mod: GP | Performed by: PHYSICAL THERAPIST

## 2022-01-06 PROCEDURE — 97140 MANUAL THERAPY 1/> REGIONS: CPT | Mod: GP | Performed by: PHYSICAL THERAPIST

## 2022-01-13 ENCOUNTER — THERAPY VISIT (OUTPATIENT)
Dept: PHYSICAL THERAPY | Facility: CLINIC | Age: 77
End: 2022-01-13
Payer: COMMERCIAL

## 2022-01-13 DIAGNOSIS — M79.672 LEFT FOOT PAIN: Primary | ICD-10-CM

## 2022-01-13 PROCEDURE — 97140 MANUAL THERAPY 1/> REGIONS: CPT | Mod: GP | Performed by: PHYSICAL THERAPIST

## 2022-01-13 PROCEDURE — 97110 THERAPEUTIC EXERCISES: CPT | Mod: GP | Performed by: PHYSICAL THERAPIST

## 2022-02-04 ENCOUNTER — THERAPY VISIT (OUTPATIENT)
Dept: PHYSICAL THERAPY | Facility: CLINIC | Age: 77
End: 2022-02-04
Payer: COMMERCIAL

## 2022-02-04 DIAGNOSIS — M79.672 LEFT FOOT PAIN: Primary | ICD-10-CM

## 2022-02-04 PROCEDURE — 97140 MANUAL THERAPY 1/> REGIONS: CPT | Mod: GP | Performed by: PHYSICAL THERAPIST

## 2022-02-04 PROCEDURE — 97110 THERAPEUTIC EXERCISES: CPT | Mod: GP | Performed by: PHYSICAL THERAPIST

## 2022-02-04 NOTE — PROGRESS NOTES
DISCHARGE REPORT    Progress reporting period is from 12/10/21 to 2/4/22.       SUBJECTIVE    Subjective: No change in sxs.  Working on HEP.    Changes in function:  Yes (See Goal flowsheet attached for changes in current functional level)  Adverse reaction to treatment or activity: None    OBJECTIVE  Changes noted in objective findings:  Yes  Objective: No cane today.  Continues to have moderate TTP medial calcaneus.     ASSESSMENT/PLAN  Updated problem list and treatment plan: Diagnosis 1:  Plantar fasciitis  STG/LTGs have been met or progress has been made towards goals:  Yes (See Goal flow sheet completed today.)  Assessment of Progress: The patient's condition is improving.  Self Management Plans:  Patient has been instructed in a home treatment program.  I have re-evaluated this patient and find that the nature, scope, duration and intensity of the therapy is appropriate for the medical condition of the patient.  Jeanette continues to require the following intervention to meet STG and LTG's:  PT intervention is no longer required to meet STG/LTG.    Recommendations:  This patient is ready to be discharged from therapy and continue their home treatment program.    Please refer to the daily flowsheet for treatment today, total treatment time and time spent performing 1:1 timed codes.

## 2022-03-07 ENCOUNTER — LAB REQUISITION (OUTPATIENT)
Dept: LAB | Facility: CLINIC | Age: 77
End: 2022-03-07
Payer: COMMERCIAL

## 2022-03-07 DIAGNOSIS — I10 ESSENTIAL (PRIMARY) HYPERTENSION: ICD-10-CM

## 2022-03-07 DIAGNOSIS — E03.8 OTHER SPECIFIED HYPOTHYROIDISM: ICD-10-CM

## 2022-03-07 DIAGNOSIS — E55.9 VITAMIN D DEFICIENCY, UNSPECIFIED: ICD-10-CM

## 2022-03-07 LAB
ALBUMIN SERPL-MCNC: 3.9 G/DL (ref 3.4–5)
ALP SERPL-CCNC: 119 U/L (ref 40–150)
ALT SERPL W P-5'-P-CCNC: 21 U/L (ref 0–50)
ANION GAP SERPL CALCULATED.3IONS-SCNC: 7 MMOL/L (ref 3–14)
AST SERPL W P-5'-P-CCNC: 25 U/L (ref 0–45)
BILIRUB SERPL-MCNC: 0.3 MG/DL (ref 0.2–1.3)
BUN SERPL-MCNC: 13 MG/DL (ref 7–30)
CALCIUM SERPL-MCNC: 8.9 MG/DL (ref 8.5–10.1)
CHLORIDE BLD-SCNC: 107 MMOL/L (ref 94–109)
CHOLEST SERPL-MCNC: 182 MG/DL
CO2 SERPL-SCNC: 26 MMOL/L (ref 20–32)
CREAT SERPL-MCNC: 0.73 MG/DL (ref 0.52–1.04)
DEPRECATED CALCIDIOL+CALCIFEROL SERPL-MC: 33 UG/L (ref 20–75)
FASTING STATUS PATIENT QL REPORTED: YES
FERRITIN SERPL-MCNC: 295 NG/ML (ref 8–252)
GFR SERPL CREATININE-BSD FRML MDRD: 84 ML/MIN/1.73M2
GLUCOSE BLD-MCNC: 99 MG/DL (ref 70–99)
HDLC SERPL-MCNC: 44 MG/DL
LDLC SERPL CALC-MCNC: 111 MG/DL
NONHDLC SERPL-MCNC: 138 MG/DL
POTASSIUM BLD-SCNC: 4.1 MMOL/L (ref 3.4–5.3)
PROT SERPL-MCNC: 7.7 G/DL (ref 6.8–8.8)
SODIUM SERPL-SCNC: 140 MMOL/L (ref 133–144)
T4 FREE SERPL-MCNC: 0.89 NG/DL (ref 0.76–1.46)
TRIGL SERPL-MCNC: 136 MG/DL
TSH SERPL DL<=0.005 MIU/L-ACNC: 3.31 MU/L (ref 0.4–4)
VIT B12 SERPL-MCNC: 599 PG/ML (ref 193–986)

## 2022-03-07 PROCEDURE — 80061 LIPID PANEL: CPT | Performed by: NURSE PRACTITIONER

## 2022-03-07 PROCEDURE — 82607 VITAMIN B-12: CPT | Mod: ORL | Performed by: NURSE PRACTITIONER

## 2022-03-07 PROCEDURE — 82728 ASSAY OF FERRITIN: CPT | Mod: ORL | Performed by: NURSE PRACTITIONER

## 2022-03-07 PROCEDURE — 84439 ASSAY OF FREE THYROXINE: CPT | Mod: ORL | Performed by: NURSE PRACTITIONER

## 2022-03-07 PROCEDURE — 80053 COMPREHEN METABOLIC PANEL: CPT | Mod: ORL | Performed by: NURSE PRACTITIONER

## 2022-03-07 PROCEDURE — 82306 VITAMIN D 25 HYDROXY: CPT | Mod: ORL | Performed by: NURSE PRACTITIONER

## 2022-03-07 PROCEDURE — 84443 ASSAY THYROID STIM HORMONE: CPT | Mod: ORL | Performed by: NURSE PRACTITIONER

## 2022-03-23 ENCOUNTER — OFFICE VISIT (OUTPATIENT)
Dept: ORTHOPEDICS | Facility: CLINIC | Age: 77
End: 2022-03-23
Payer: COMMERCIAL

## 2022-03-23 DIAGNOSIS — M72.2 PLANTAR FASCIITIS: Primary | ICD-10-CM

## 2022-03-23 PROCEDURE — 99214 OFFICE O/P EST MOD 30 MIN: CPT | Performed by: PODIATRIST

## 2022-03-23 NOTE — LETTER
3/23/2022         RE: Jeanette Mccurdy  [address]  [address]  [facility] [address]        Dear Colleague,    Thank you for referring your patient, Jeanette Mccurdy, to the [facility] [place]. Please see a copy of my visit note below.    Chief Complaint   Patient presents with     Follow Up     Left plantar fasciitis.              Allergies   Allergen Reactions     Grapefruit Concentrate Itching     Grapefruit Extract Itching     Fish-Derived Products Itching     Lisinopril Other (See Comments)     [employer]#[id number] says she coughs when she takes     Omeprazole GI Disturbance     Peanut-Derived Rash     Bumps on tongue         Subjective: Jeanette is a 76 year old female who presents to the clinic today for a follow up of left foot pain.   She relates that she continues to have pain in the heel.  She has seen physical therapy.  She has tried 2 injections into this heel.  She also had orthotics and a cam booting.  Swelling therapy was used today.      Objective  Data Unavailable Data Unavailable Data Unavailable Data Unavailable Data Unavailable 0 lbs 0 oz  DP and PT pulses are 2/4.  Capillary refill time is instant.  Positive pedal hair.  Gross sensation is intact.  Pain is noted on the left heel at the medial attachment of the plantar fascia on the calcaneus.  Some pain noted with lateral calcaneal squeeze.  Some pain noted along the course of the PT, peroneal, and Achilles tendons on the left.  No pain noted in the ankle joint with range of motion or palpation.  Pain noted with SCJ range of motion.    Assessment: Jeanette is a 76-year-old with left plantar fasciitis.  She is not had improvement in pain with the conservative therapies that we have done.  I recommended we get an MRI of the area.  She does agree to this.  She would like to also do massage.  I discussed that she should schedule this in the community.    Plan:   - Pt seen and evaluated.  -MRI was ordered for her.  -I  discussed booting her again.  She will defer this for now.  -See again status post MRI..  I spent 31 minutes today with patient care, documentation, chart review, and care coordination.      Patel Taylor DPM

## 2022-03-23 NOTE — PROGRESS NOTES
Chief Complaint   Patient presents with     Follow Up     Left plantar fasciitis.              Allergies   Allergen Reactions     Grapefruit Concentrate Itching     Grapefruit Extract Itching     Fish-Derived Products Itching     Lisinopril Other (See Comments)     [employer]#[id number] says she coughs when she takes     Omeprazole GI Disturbance     Peanut-Derived Rash     Bumps on tongue         Subjective: Jeanette is a 76 year old female who presents to the clinic today for a follow up of left foot pain.   She relates that she continues to have pain in the heel.  She has seen physical therapy.  She has tried 2 injections into this heel.  She also had orthotics and a cam booting.  Swelling therapy was used today.      Objective  Data Unavailable Data Unavailable Data Unavailable Data Unavailable Data Unavailable 0 lbs 0 oz  DP and PT pulses are 2/4.  Capillary refill time is instant.  Positive pedal hair.  Gross sensation is intact.  Pain is noted on the left heel at the medial attachment of the plantar fascia on the calcaneus.  Some pain noted with lateral calcaneal squeeze.  Some pain noted along the course of the PT, peroneal, and Achilles tendons on the left.  No pain noted in the ankle joint with range of motion or palpation.  Pain noted with SCJ range of motion.        Assessment: Jeanette is a 76-year-old with left plantar fasciitis.  She is not had improvement in pain with the conservative therapies that we have done.  I recommended we get an MRI of the area.  She does agree to this.  She would like to also do massage.  I discussed that she should schedule this in the community.    Plan:   - Pt seen and evaluated.  -MRI was ordered for her.  -I discussed booting her again.  She will defer this for now.  -See again status post MRI..  I spent 31 minutes today with patient care, documentation, chart review, and care coordination.

## 2022-03-25 ENCOUNTER — ANCILLARY PROCEDURE (OUTPATIENT)
Dept: MRI IMAGING | Facility: CLINIC | Age: 77
End: 2022-03-25
Attending: PODIATRIST
Payer: COMMERCIAL

## 2022-03-25 DIAGNOSIS — M72.2 PLANTAR FASCIITIS: ICD-10-CM

## 2022-03-25 PROCEDURE — 73721 MRI JNT OF LWR EXTRE W/O DYE: CPT | Mod: LT | Performed by: RADIOLOGY

## 2022-04-05 ENCOUNTER — TELEPHONE (OUTPATIENT)
Dept: WOUND CARE | Facility: CLINIC | Age: 77
End: 2022-04-05
Payer: COMMERCIAL

## 2022-04-05 NOTE — TELEPHONE ENCOUNTER
[facility] Call Center    Phone Message    May a detailed message be left on voicemail: yes     Reason for Call: Other: pt is waiting for results for MRI she said someone was going to call via      Action Taken: Other: ortho    Travel Screening: Not Applicable

## 2022-07-15 ENCOUNTER — OFFICE VISIT (OUTPATIENT)
Dept: ORTHOPEDICS | Facility: CLINIC | Age: 77
End: 2022-07-15
Payer: COMMERCIAL

## 2022-07-15 DIAGNOSIS — M79.672 LEFT FOOT PAIN: ICD-10-CM

## 2022-07-15 DIAGNOSIS — M72.2 PLANTAR FASCIITIS: Primary | ICD-10-CM

## 2022-07-15 PROCEDURE — 99213 OFFICE O/P EST LOW 20 MIN: CPT | Performed by: PODIATRIST

## 2022-07-15 NOTE — LETTER
7/15/2022         RE: Jeanette Mccurdy  [address]  [address]  [facility] [address]        Dear Colleague,    Thank you for referring your patient, Jeanette Mccurdy, to the [facility] [place]. Please see a copy of my visit note below.    Chief Complaint   Patient presents with     Follow Up     Pain, left foot.             Allergies   Allergen Reactions     Grapefruit Concentrate Itching     Grapefruit Extract Itching     Fish-Derived Products Itching     Lisinopril Other (See Comments)     [employer]acyRecord#[id number] says she coughs when she takes     Omeprazole GI Disturbance     Peanut-Derived Rash     Bumps on tongue         Subjective: Jeanette is a 76 year old female who presents to the clinic today for a follow up of left foot pain.   She relates that she continues to have pain in the heel.  She has seen physical therapy.  She has tried 1 injection into this heel.  She also had orthotics and a cam booting.  She presents with her son today.  Although the pain is decreased some, she relates that she continues to have pain in the plantar heel.  She did have an MRI performed      Objective  Data Unavailable Data Unavailable Data Unavailable Data Unavailable Data Unavailable 0 lbs 0 oz  DP and PT pulses are 2/4.  Capillary refill time is instant.  Positive pedal hair.  Gross sensation is intact.  Pain is noted on the left heel at the medial attachment of the plantar fascia on the calcaneus.  Some pain noted with lateral calcaneal squeeze.  Some pain noted along the course of the PT, peroneal, and Achilles tendons on the left.  No pain noted in the ankle joint with range of motion or palpation.  Pain noted with SCJ range of motion.    MRI Impression:  1. MRI findings suggestive of medial and central cord plantar  fasciitis.  2. Otherwise, the tendinous and ligamentous structures about the left  ankle are intact.  3. Mild scattered degenerative changes of the visualized bones of  foot.     I  have personally reviewed the examination and initial interpretation  and I agree with the findings.     KARO CONCEPCION MD     Assessment: Jeanette is a 76-year-old with left plantar fasciitis.  She is not had improvement in pain with the conservative therapies that we have done.  MRI shows plantar fasciitis.  I discussed with her trying another injection.  She like to defer this for now.  I discussed putting her in a boot for 4 weeks.  She will do this.    Plan:   - Pt seen and evaluated.  -MRI was discussed with her.  -I discussed booting her again.  She would like to try this.  We did dispense a boot for her.  -See again in 4 months.  I spent 25 minutes today with patient care, documentation, chart review, and care coordination.        Patel Taylor DPM

## 2022-07-15 NOTE — PROGRESS NOTES
Chief Complaint   Patient presents with     Follow Up     Pain, left foot.             Allergies   Allergen Reactions     Grapefruit Concentrate Itching     Grapefruit Extract Itching     Fish-Derived Products Itching     Lisinopril Other (See Comments)     [employer]#[id number] says she coughs when she takes     Omeprazole GI Disturbance     Peanut-Derived Rash     Bumps on tongue         Subjective: Jeanette is a 76 year old female who presents to the clinic today for a follow up of left foot pain.   She relates that she continues to have pain in the heel.  She has seen physical therapy.  She has tried 1 injection into this heel.  She also had orthotics and a cam booting.  She presents with her son today.  Although the pain is decreased some, she relates that she continues to have pain in the plantar heel.  She did have an MRI performed      Objective  Data Unavailable Data Unavailable Data Unavailable Data Unavailable Data Unavailable 0 lbs 0 oz  DP and PT pulses are 2/4.  Capillary refill time is instant.  Positive pedal hair.  Gross sensation is intact.  Pain is noted on the left heel at the medial attachment of the plantar fascia on the calcaneus.  Some pain noted with lateral calcaneal squeeze.  Some pain noted along the course of the PT, peroneal, and Achilles tendons on the left.  No pain noted in the ankle joint with range of motion or palpation.  Pain noted with SCJ range of motion.    MRI Impression:  1. MRI findings suggestive of medial and central cord plantar  fasciitis.  2. Otherwise, the tendinous and ligamentous structures about the left  ankle are intact.  3. Mild scattered degenerative changes of the visualized bones of  foot.     I have personally reviewed the examination and initial interpretation  and I agree with the findings.     KARO CONCEPCION MD     Assessment: Jeanette is a 76-year-old with left plantar fasciitis.  She is not had improvement in pain with the conservative therapies that we have  done.  MRI shows plantar fasciitis.  I discussed with her trying another injection.  She like to defer this for now.  I discussed putting her in a boot for 4 weeks.  She will do this.    Plan:   - Pt seen and evaluated.  -MRI was discussed with her.  -I discussed booting her again.  She would like to try this.  We did dispense a boot for her.  -See again in 4 months.  I spent 25 minutes today with patient care, documentation, chart review, and care coordination.

## 2023-03-01 ENCOUNTER — LAB REQUISITION (OUTPATIENT)
Dept: LAB | Facility: CLINIC | Age: 78
End: 2023-03-01
Payer: COMMERCIAL

## 2023-03-01 DIAGNOSIS — E03.8 OTHER SPECIFIED HYPOTHYROIDISM: ICD-10-CM

## 2023-03-01 DIAGNOSIS — I10 ESSENTIAL (PRIMARY) HYPERTENSION: ICD-10-CM

## 2023-03-01 DIAGNOSIS — J30.89 OTHER ALLERGIC RHINITIS: ICD-10-CM

## 2023-03-01 DIAGNOSIS — E55.9 VITAMIN D DEFICIENCY, UNSPECIFIED: ICD-10-CM

## 2023-03-01 LAB
ANION GAP SERPL CALCULATED.3IONS-SCNC: 14 MMOL/L (ref 7–15)
BUN SERPL-MCNC: 17.1 MG/DL (ref 8–23)
CALCIUM SERPL-MCNC: 9.1 MG/DL (ref 8.8–10.2)
CHLORIDE SERPL-SCNC: 103 MMOL/L (ref 98–107)
CHOLEST SERPL-MCNC: 179 MG/DL
CREAT SERPL-MCNC: 0.87 MG/DL (ref 0.51–0.95)
DEPRECATED HCO3 PLAS-SCNC: 22 MMOL/L (ref 22–29)
GFR SERPL CREATININE-BSD FRML MDRD: 67 ML/MIN/1.73M2
GLUCOSE SERPL-MCNC: 100 MG/DL (ref 70–99)
HBA1C MFR BLD: 5.6 %
HDLC SERPL-MCNC: 48 MG/DL
LDLC SERPL CALC-MCNC: 101 MG/DL
NONHDLC SERPL-MCNC: 131 MG/DL
POTASSIUM SERPL-SCNC: 4.1 MMOL/L (ref 3.4–5.3)
SODIUM SERPL-SCNC: 139 MMOL/L (ref 136–145)
T4 FREE SERPL-MCNC: 0.91 NG/DL (ref 0.9–1.7)
TRIGL SERPL-MCNC: 151 MG/DL
TSH SERPL DL<=0.005 MIU/L-ACNC: 5.94 UIU/ML (ref 0.3–4.2)

## 2023-03-01 PROCEDURE — 83036 HEMOGLOBIN GLYCOSYLATED A1C: CPT | Mod: ORL

## 2023-03-01 PROCEDURE — 80061 LIPID PANEL: CPT | Mod: ORL

## 2023-03-01 PROCEDURE — 84443 ASSAY THYROID STIM HORMONE: CPT | Mod: ORL

## 2023-03-01 PROCEDURE — 80048 BASIC METABOLIC PNL TOTAL CA: CPT | Mod: ORL

## 2023-03-01 PROCEDURE — 84439 ASSAY OF FREE THYROXINE: CPT | Mod: ORL

## 2024-02-09 ENCOUNTER — LAB REQUISITION (OUTPATIENT)
Dept: LAB | Facility: CLINIC | Age: 79
End: 2024-02-09
Payer: COMMERCIAL

## 2024-02-09 ENCOUNTER — MEDICAL CORRESPONDENCE (OUTPATIENT)
Dept: HEALTH INFORMATION MANAGEMENT | Facility: CLINIC | Age: 79
End: 2024-02-09

## 2024-02-09 DIAGNOSIS — R05.3 CHRONIC COUGH: ICD-10-CM

## 2024-02-09 PROCEDURE — 87206 SMEAR FLUORESCENT/ACID STAI: CPT | Mod: ORL | Performed by: INTERNAL MEDICINE

## 2024-02-09 PROCEDURE — 87116 MYCOBACTERIA CULTURE: CPT | Mod: ORL | Performed by: INTERNAL MEDICINE

## 2024-02-10 ENCOUNTER — LAB REQUISITION (OUTPATIENT)
Dept: LAB | Facility: CLINIC | Age: 79
End: 2024-02-10
Payer: COMMERCIAL

## 2024-02-10 DIAGNOSIS — R05.3 CHRONIC COUGH: ICD-10-CM

## 2024-02-10 PROCEDURE — 87116 MYCOBACTERIA CULTURE: CPT | Mod: ORL | Performed by: INTERNAL MEDICINE

## 2024-02-10 PROCEDURE — 87206 SMEAR FLUORESCENT/ACID STAI: CPT | Mod: ORL | Performed by: INTERNAL MEDICINE

## 2024-02-12 ENCOUNTER — LAB REQUISITION (OUTPATIENT)
Dept: LAB | Facility: CLINIC | Age: 79
End: 2024-02-12
Payer: COMMERCIAL

## 2024-02-12 ENCOUNTER — TRANSCRIBE ORDERS (OUTPATIENT)
Dept: OTHER | Age: 79
End: 2024-02-12

## 2024-02-12 DIAGNOSIS — R05.3 CHRONIC COUGH: Primary | ICD-10-CM

## 2024-02-12 DIAGNOSIS — R05.3 CHRONIC COUGH: ICD-10-CM

## 2024-02-12 PROCEDURE — 87116 MYCOBACTERIA CULTURE: CPT | Mod: ORL | Performed by: INTERNAL MEDICINE

## 2024-02-12 PROCEDURE — 87206 SMEAR FLUORESCENT/ACID STAI: CPT | Mod: ORL | Performed by: INTERNAL MEDICINE

## 2024-02-14 DIAGNOSIS — R05.3 CHRONIC COUGH: Primary | ICD-10-CM

## 2024-02-15 ENCOUNTER — TELEPHONE (OUTPATIENT)
Dept: PULMONOLOGY | Facility: CLINIC | Age: 79
End: 2024-02-15
Payer: COMMERCIAL

## 2024-02-15 NOTE — TELEPHONE ENCOUNTER
Patient Contacted via [employer]     Appointment type: CXR  Provider: RAJ  Return date: 05/01/2024  Specialty phone number: [phone]  Additional appointment(s) needed: N/A  Additonal Notes: N/A

## 2024-04-05 LAB
ACID FAST STAIN (ARUP): NORMAL

## 2024-04-06 LAB
ACID FAST STAIN (ARUP): NORMAL

## 2024-04-08 LAB
ACID FAST STAIN (ARUP): NORMAL
ACID FAST STAIN (ARUP): NORMAL

## 2024-04-25 NOTE — CONFIDENTIAL NOTE
RECORDS RECEIVED FROM: internal    DATE RECEIVED: 5.1.24    NOTES STATUS DETAILS   OFFICE NOTE from referring provider internal    Brian Narvaez MD      MEDICATION LIST internal     IMAGING  (NEED IMAGES AND REPORTS)     CT SCAN     CHEST XRAY (CXR) internal  Scheduled 5.1.24   TESTS     PULMONARY FUNCTION TESTING (PFT) internal  Scheduled 5.1.24

## 2024-05-01 ENCOUNTER — PRE VISIT (OUTPATIENT)
Dept: PULMONOLOGY | Facility: CLINIC | Age: 79
End: 2024-05-01

## (undated) DEVICE — ENDO BITE BLOCK ADULT OMNI-BLOC

## (undated) DEVICE — SOL WATER IRRIG 1000ML BOTTLE 2F7114

## (undated) DEVICE — SYR 30ML SLIP TIP W/O NDL 302833

## (undated) DEVICE — SUCTION MANIFOLD NEPTUNE 2 SYS 1 PORT 702-025-000

## (undated) DEVICE — KIT ENDO TURNOVER/PROCEDURE CARRY-ON 101822

## (undated) DEVICE — SUCTION CATH AIRLIFE TRI-FLO W/CONTROL PORT 14FR  T60C

## (undated) DEVICE — KIT ENDO FIRST STEP DISINFECTANT 200ML W/POUCH EP-4

## (undated) DEVICE — GOWN IMPERVIOUS 2XL BLUE

## (undated) RX ORDER — LIDOCAINE HYDROCHLORIDE 10 MG/ML
INJECTION, SOLUTION EPIDURAL; INFILTRATION; INTRACAUDAL; PERINEURAL
Status: DISPENSED
Start: 2021-06-30

## (undated) RX ORDER — LIDOCAINE HYDROCHLORIDE 10 MG/ML
INJECTION, SOLUTION INFILTRATION; PERINEURAL
Status: DISPENSED
Start: 2017-07-21

## (undated) RX ORDER — DEXAMETHASONE SODIUM PHOSPHATE 4 MG/ML
INJECTION, SOLUTION INTRA-ARTICULAR; INTRALESIONAL; INTRAMUSCULAR; INTRAVENOUS; SOFT TISSUE
Status: DISPENSED
Start: 2021-06-30

## (undated) RX ORDER — TRIAMCINOLONE ACETONIDE 40 MG/ML
INJECTION, SUSPENSION INTRA-ARTICULAR; INTRAMUSCULAR
Status: DISPENSED
Start: 2021-06-30

## (undated) RX ORDER — FENTANYL CITRATE 50 UG/ML
INJECTION, SOLUTION INTRAMUSCULAR; INTRAVENOUS
Status: DISPENSED
Start: 2021-04-29

## (undated) RX ORDER — DEXAMETHASONE SODIUM PHOSPHATE 4 MG/ML
INJECTION, SOLUTION INTRA-ARTICULAR; INTRALESIONAL; INTRAMUSCULAR; INTRAVENOUS; SOFT TISSUE
Status: DISPENSED
Start: 2017-07-21

## (undated) RX ORDER — TRIAMCINOLONE ACETONIDE 40 MG/ML
INJECTION, SUSPENSION INTRA-ARTICULAR; INTRAMUSCULAR
Status: DISPENSED
Start: 2017-07-21